# Patient Record
Sex: FEMALE | Race: BLACK OR AFRICAN AMERICAN | Employment: UNEMPLOYED | URBAN - METROPOLITAN AREA
[De-identification: names, ages, dates, MRNs, and addresses within clinical notes are randomized per-mention and may not be internally consistent; named-entity substitution may affect disease eponyms.]

---

## 2017-07-25 ENCOUNTER — TELEPHONE (OUTPATIENT)
Dept: FAMILY MEDICINE CLINIC | Age: 22
End: 2017-07-25

## 2017-07-25 DIAGNOSIS — H40.009 GLAUCOMA SUSPECT, UNSPECIFIED LATERALITY: Primary | ICD-10-CM

## 2017-07-25 NOTE — TELEPHONE ENCOUNTER
Pt is requesting a referral to an Ophthalmologist for Glaucoma. Pt was told that she has glaucoma by her ophthalomologist at her regular eye appt. Please advise. , has  [employer] and doesn't have a preference.

## 2017-10-13 ENCOUNTER — OFFICE VISIT (OUTPATIENT)
Dept: FAMILY MEDICINE CLINIC | Age: 22
End: 2017-10-13

## 2017-10-13 VITALS
TEMPERATURE: 98 F | OXYGEN SATURATION: 100 % | SYSTOLIC BLOOD PRESSURE: 98 MMHG | DIASTOLIC BLOOD PRESSURE: 70 MMHG | HEIGHT: 64 IN | BODY MASS INDEX: 26.53 KG/M2 | WEIGHT: 155.4 LBS | RESPIRATION RATE: 16 BRPM | HEART RATE: 76 BPM

## 2017-10-13 DIAGNOSIS — L85.3 DRY SKIN: Primary | ICD-10-CM

## 2017-10-13 NOTE — PATIENT INSTRUCTIONS
Dermatitis: Care Instructions  Your Care Instructions  Dermatitis is the general name used for any rash or inflammation of the skin. Different kinds of dermatitis cause different kinds of rashes. Common causes of a rash include new medicines, plants (such as poison oak or poison ivy), heat, and stress. Certain illnesses can also cause a rash. An allergic reaction to something that touches your skin, such as latex, nickel, or poison ivy, is called contact dermatitis. Contact dermatitis may also be caused by something that irritates the skin, such as bleach, a chemical, or soap. These types of rashes cannot be spread from person to person. How long your rash will last depends on what caused it. Rashes may last a few days or months. Follow-up care is a key part of your treatment and safety. Be sure to make and go to all appointments, and call your doctor if you are having problems. It's also a good idea to know your test results and keep a list of the medicines you take. How can you care for yourself at home? · Do not scratch the rash. Cut your nails short, and file them smooth. Or wear gloves if this helps keep you from scratching. · Wash the area with water only. Pat dry. · Put cold, wet cloths on the rash to reduce itching. · Keep cool, and stay out of the sun. · Leave the rash open to the air as much as possible. · If the rash itches, use hydrocortisone cream. Follow the directions on the label. Calamine lotion may help for plant rashes. · Take an over-the-counter antihistamine, such as diphenhydramine (Benadryl) or loratadine (Claritin), to help calm the itching. Read and follow all instructions on the label. · If your doctor prescribed a cream, use it as directed. If your doctor prescribed medicine, take it exactly as directed. When should you call for help?   Call your doctor now or seek immediate medical care if:  · You have symptoms of infection, such as:  ¨ Increased pain, swelling, warmth, or redness. ¨ Red streaks leading from the area. ¨ Pus draining from the area. ¨ A fever. · You have joint pain along with the rash. Watch closely for changes in your health, and be sure to contact your doctor if:  · Your rash is changing or getting worse. · You are not getting better as expected. Where can you learn more? Go to http://guido-michael.info/. Enter ([phone] [phone] in the search box to learn more about \"Dermatitis: Care Instructions. \"  Current as of: October 13, 2016  Content Version: 11.3  © 7313-6583 [employer]. Care instructions adapted under license by [employer] (which disclaims liability or warranty for this information). If you have questions about a medical condition or this instruction, always ask your healthcare professional. [address] any warranty or liability for your use of this information.

## 2017-10-13 NOTE — PROGRESS NOTES
HISTORY OF PRESENT ILLNESS  Venessa Maldonado is a 22 y.o. female. HPI: dr. [facility] [place] patient. Here with concern of dry skin mainly over chest area and around the mouth. No rash or dryness over elbow or knee. Denies any h/o asthma or eczema. Currently using dove soap and using Cetaphil lotion. Not helping much currently. Said she has been using this since long time. No recent change in soap or lotion. Denies any itching over dry skin. No h/o asthma. No h/o allergies. [place] it happens only this time of the year. No other concern. No fever. No sore throat, no itchy throat or nose. No sneezing. No abdominal pain. No urinary or bowel complains. No nausea or vomiting. Visit Vitals    BP 98/70 (BP 1 Location: Left arm, BP Patient Position: Sitting)    Pulse 76    Temp 98 °F (36.7 °C) (Oral)    Resp 16    Ht 5' 3.5\" (1.613 m)    Wt 155 lb 6.4 oz (70.5 kg)    SpO2 100%    BMI 27.1 kg/m2         ROS: see HPI     Physical Exam   Constitutional: She is oriented to person, place, and time. No distress. Neurological: She is oriented to person, place, and time. Skin:   Dry skin over chest and around mouth area. No open skin. No tenderness or itching. ASSESSMENT and PLAN    ICD-10-CM ICD-9-CM    1. Dry skin: for now advised to use Aquaphor lotion and use Aveeno soap and see if that is helping her. Over all it is dry skin. No itching will observe and f/u with PCP in couple of months. And if no improvement can consider dermatology referral.  L85.3 701.1    Pt understood and agree with the plan     Follow-up Disposition:  Return in about 2 months (around 12/13/2017).

## 2017-10-13 NOTE — PROGRESS NOTES
1. Have you been to the ER, urgent care clinic since your last visit? Hospitalized since your last visit? No    2. Have you seen or consulted any other health care providers outside of the [address] [place] [address] since your last visit? Include any pap smears or colon screening. No    Patient declined flu vaccine.

## 2017-10-13 NOTE — MR AVS SNAPSHOT
Visit Information Date & Time Provider Department Dept. Phone Encounter #  
 10/13/2017  9:45 AM Giles Membreno, [address] [id number] Follow-up Instructions Return in about 2 months (around 12/13/2017). Upcoming Health Maintenance Date Due Hepatitis A Peds Age 1-18 (2 of 2 - Standard Series) 3/26/2017 DTaP/Tdap/Td series (7 - Td) 8/26/2018 Allergies as of 10/13/2017  Review Complete On: 10/13/2017 By: Giles Membreno MD  
 No Known Allergies Current Immunizations  Reviewed on 9/26/2016 Name Date DTAP Vaccine 9/19/2001, 6/2/1998, 8/7/1997, 6/2/1997, 3/6/1997 HIB Vaccine 6/2/1998, 8/7/1997, 6/2/1997, 3/6/1997 Hep A Vaccine (Adult) 9/26/2016 Hepatitis B Vaccine 6/2/1998, 3/6/1997, 1996 Human Papillomavirus 1/4/2011, 11/19/2010, 8/26/2009 IPV 9/19/2001, 8/7/1997, 6/2/1997, 3/6/1997 Influenza Vaccine (Quad) PF 9/26/2016 Influenza Vaccine Split 11/19/2010 Influenza Vaccine Whole 6/2/1998 MMR Vaccine 9/19/2001, 6/2/1998 TDAP Vaccine 8/26/2008 Varicella Virus Vaccine Live 8/26/2008, 6/2/1998 Not reviewed this visit You Were Diagnosed With   
  
 Codes Comments Dry skin    -  Primary ICD-10-CM: L85.3 ICD-9-CM: 701.1 Vitals BP Pulse Temp Resp Height(growth percentile) Weight(growth percentile) 98/70 (BP 1 Location: Left arm, BP Patient Position: Sitting) 76 98 °F (36.7 °C) (Oral) 16 5' 3.5\" (1.613 m) 155 lb 6.4 oz (70.5 kg) LMP SpO2 BMI OB Status Smoking Status 10/08/2017 100% 27.1 kg/m2 Having regular periods Never Smoker Vitals History BMI and BSA Data Body Mass Index Body Surface Area  
 27.1 kg/m 2 1.78 m 2 Preferred Pharmacy Pharmacy Name Phone [phone] - [address], [address] [place] [address] [phone] Your Updated Medication List  
  
   
 This list is accurate as of: 10/13/17 10:43 AM.  Always use your most recent med list.  
  
  
  
  
 clobetasol 0.05 % ointment Commonly known as:  Lorraine Stuart Apply  to affected area two (2) times a day. Follow-up Instructions Return in about 2 months (around 12/13/2017). Patient Instructions Dermatitis: Care Instructions Your Care Instructions Dermatitis is the general name used for any rash or inflammation of the skin. Different kinds of dermatitis cause different kinds of rashes. Common causes of a rash include new medicines, plants (such as poison oak or poison ivy), heat, and stress. Certain illnesses can also cause a rash. An allergic reaction to something that touches your skin, such as latex, nickel, or poison ivy, is called contact dermatitis. Contact dermatitis may also be caused by something that irritates the skin, such as bleach, a chemical, or soap. These types of rashes cannot be spread from person to person. How long your rash will last depends on what caused it. Rashes may last a few days or months. Follow-up care is a key part of your treatment and safety. Be sure to make and go to all appointments, and call your doctor if you are having problems. It's also a good idea to know your test results and keep a list of the medicines you take. How can you care for yourself at home? · Do not scratch the rash. Cut your nails short, and file them smooth. Or wear gloves if this helps keep you from scratching. · Wash the area with water only. Pat dry. · Put cold, wet cloths on the rash to reduce itching. · Keep cool, and stay out of the sun. · Leave the rash open to the air as much as possible. · If the rash itches, use hydrocortisone cream. Follow the directions on the label. Calamine lotion may help for plant rashes. · Take an over-the-counter antihistamine, such as diphenhydramine (Benadryl) or loratadine (Claritin), to help calm the itching.  Read and follow all instructions on the label. · If your doctor prescribed a cream, use it as directed. If your doctor prescribed medicine, take it exactly as directed. When should you call for help? Call your doctor now or seek immediate medical care if: 
· You have symptoms of infection, such as: 
¨ Increased pain, swelling, warmth, or redness. ¨ Red streaks leading from the area. ¨ Pus draining from the area. ¨ A fever. · You have joint pain along with the rash. Watch closely for changes in your health, and be sure to contact your doctor if: 
· Your rash is changing or getting worse. · You are not getting better as expected. Where can you learn more? Go to http://guidoSNRLabsmichael.info/. Enter ([phone] 8295 3617 in the search box to learn more about \"Dermatitis: Care Instructions. \" Current as of: October 13, 2016 Content Version: 11.3 © 7274-5506 [employer] Feed. Care instructions adapted under license by "[employer] (which disclaims liability or warranty for this information). If you have questions about a medical condition or this instruction, always ask your healthcare professional. [address] any warranty or liability for your use of this information. Introducing \[facility]\"" & HEALTH SERVICES! Tatum Cotton introduces ComVibe patient portal. Now you can access parts of your medical record, email your doctor's office, and request medication refills online. 1. In your internet browser, go to [email or web address] 2. Click on the First Time User? Click Here link in the Sign In box. You will see the New Member Sign Up page. 3. Enter your ComVibe Access Code exactly as it appears below. You will not need to use this code after youve completed the sign-up process. If you do not sign up before the expiration date, you must request a new code. · ComVibe Access Code: [email or web address] Expires: 1/11/2018 10:43 AM 
 
 4. Enter the last four digits of your Social Security Number (xxxx) and Date of Birth (mm/dd/yyyy) as indicated and click Submit. You will be taken to the next sign-up page. 5. Create a Planbus ID. This will be your Planbus login ID and cannot be changed, so think of one that is secure and easy to remember. 6. Create a Planbus password. You can change your password at any time. 7. Enter your Password Reset Question and Answer. This can be used at a later time if you forget your password. 8. Enter your e-mail address. You will receive e-mail notification when new information is available in [address] 9. Click Sign Up. You can now view and download portions of your medical record. 10. Click the Download Summary menu link to download a portable copy of your medical information. If you have questions, please visit the Frequently Asked Questions section of the Planbus website. Remember, Planbus is NOT to be used for urgent needs. For medical emergencies, dial 911. Now available from your iPhone and Android! Please provide this summary of care documentation to your next provider. Your primary care clinician is listed as Anthony Streeter. If you have any questions after today's visit, please call [phone].

## 2019-08-19 ENCOUNTER — OFFICE VISIT (OUTPATIENT)
Dept: FAMILY MEDICINE CLINIC | Age: 24
End: 2019-08-19

## 2019-08-19 VITALS
OXYGEN SATURATION: 98 % | DIASTOLIC BLOOD PRESSURE: 74 MMHG | WEIGHT: 150 LBS | HEART RATE: 79 BPM | TEMPERATURE: 98.2 F | HEIGHT: 64 IN | RESPIRATION RATE: 16 BRPM | BODY MASS INDEX: 25.61 KG/M2 | SYSTOLIC BLOOD PRESSURE: 108 MMHG

## 2019-08-19 DIAGNOSIS — G44.219 EPISODIC TENSION-TYPE HEADACHE, NOT INTRACTABLE: Primary | ICD-10-CM

## 2019-08-19 DIAGNOSIS — L81.0 POST-INFLAMMATORY HYPERPIGMENTATION: ICD-10-CM

## 2019-08-19 DIAGNOSIS — F43.9 STRESS: ICD-10-CM

## 2019-08-19 RX ORDER — AMITRIPTYLINE HYDROCHLORIDE 10 MG/1
10 TABLET, FILM COATED ORAL
Qty: 30 TAB | Refills: 0 | Status: SHIPPED | OUTPATIENT
Start: 2019-08-19 | End: 2019-10-08 | Stop reason: SDUPTHER

## 2019-08-19 RX ORDER — TRIAMCINOLONE ACETONIDE 0.25 MG/G
OINTMENT TOPICAL 2 TIMES DAILY
Qty: 30 G | Refills: 0 | Status: SHIPPED | OUTPATIENT
Start: 2019-08-19 | End: 2022-06-07

## 2019-08-19 NOTE — PROGRESS NOTES
Clifford Adan, 26 y.o.,  female    SUBJECTIVE  Headache x 3-4 months    Pt reports bitemporal throbbing headache occurring about 2-3 x a week, for the past 3-4 months now. Says she does not know of particular trigger aside from school related stress. She denies nausea, aura, light or noise sensitivity, family history of migraines. Says no pattern with sleep, type of foods, weather. She takes otc midol with relief    Also with concern of chest area scaling. She says ongoing of years. Not related to soap, detergents or any topical agents. Says moisturizing frequently. No known sick contacts    ROS:  See HPI, all others negative        Patient Active Problem List   Diagnosis Code    ADD (attention deficit disorder) F98.8    Keloid L91.0       Current Outpatient Medications   Medication Sig Dispense Refill    amitriptyline (ELAVIL) 10 mg tablet Take 1 Tab by mouth nightly. 30 Tab 0    triamcinolone acetonide (KENALOG) 0.025 % ointment Apply  to affected area two (2) times a day. use thin layer 30 g 0       No Known Allergies    History reviewed. No pertinent past medical history.     Social History     Socioeconomic History    Marital status: SINGLE     Spouse name: Not on file    Number of children: Not on file    Years of education: Not on file    Highest education level: Not on file   Occupational History    Not on file   Social Needs    Financial resource strain: Not on file    Food insecurity:     Worry: Not on file     Inability: Not on file    Transportation needs:     Medical: Not on file     Non-medical: Not on file   Tobacco Use    Smoking status: Never Smoker    Smokeless tobacco: Never Used   Substance and Sexual Activity    Alcohol use: No    Drug use: No    Sexual activity: Never   Lifestyle    Physical activity:     Days per week: Not on file     Minutes per session: Not on file    Stress: Not on file   Relationships    Social connections:     Talks on phone: Not on file     Gets together: Not on file     Attends [employer] service: Not on file     Active member of club or organization: Not on file     Attends meetings of clubs or organizations: Not on file     Relationship status: Not on file    Intimate partner violence:     Fear of current or ex partner: Not on file     Emotionally abused: Not on file     Physically abused: Not on file     Forced sexual activity: Not on file   Other Topics Concern    Not on file   Social History Narrative    Not on file       Family History   Problem Relation Age of Onset    Diabetes Maternal Grandmother          OBJECTIVE    Physical Exam:     Visit Vitals  /74 (BP 1 Location: Left arm, BP Patient Position: Sitting)   Pulse 79   Temp 98.2 °F (36.8 °C) (Oral)   Resp 16   Ht 5' 3.5\" (1.613 m)   Wt 150 lb (68 kg)   SpO2 98%   BMI 26.15 kg/m²       General: alert, well-appearing,  in no apparent distress or pain  Head: atraumatic. Non-tender maxillary and frontal sinuses  Eyes: Lids with no discharge, no matting, conjunctivae clear and non injected, full EOMs, PERLLA  Ears: pinna non-tender, external auditory canal patent, TM intact  Mouth/throat:tonsils non enlarged, pharynx non erythematous and no lesion, nasal mucosa normal  Neck: supple, no adenopathy palpated  CVS: normal rate, regular rhythm, distinct S1 and S2  Lungs:clear to ausculation bilaterally, no crackles, wheezing or rhonchi noted  Abdomen: normoactive bowel sounds, soft, non-tender  Extremities: no edema, no cyanosis, MSK grossly normal  Skin +  hyperpigmented patch of almost entire upper chest area, no scaling- pt says today is a good day  Psych:  mood and affect normal        ASSESSMENT/PLAN  Diagnoses and all orders for this visit:    1. Episodic tension-type headache, not intractable  Needs better control  Start:  -     amitriptyline (ELAVIL) 10 mg tablet; Take 1 Tab by mouth nightly. Cont prn nsaids  Stress management: exercise, journaling    2.  Post-inflammatory hyperpigmentation  Start:   -     triamcinolone acetonide (KENALOG) 0.025 % ointment; Apply  to affected area two (2) times a day. use thin layer    3. Stress      Follow-up and Dispositions    · Return in about 1 month (around 9/16/2019), or if symptoms worsen or fail to improve, for plan for Complete physical on next visit. Patient understands plan of care. Patient has provided input and agrees with goals.

## 2019-08-19 NOTE — PROGRESS NOTES
1. Have you been to the ER, urgent care clinic since your last visit? Hospitalized since your last visit? No    2. Have you seen or consulted any other health care providers outside of the [address] [place] [address] since your last visit? Include any pap smears or colon screening.  No

## 2019-09-16 ENCOUNTER — TELEPHONE (OUTPATIENT)
Dept: FAMILY MEDICINE CLINIC | Age: 24
End: 2019-09-16

## 2019-09-16 DIAGNOSIS — L81.0 POST-INFLAMMATORY HYPERPIGMENTATION: Primary | ICD-10-CM

## 2019-09-16 NOTE — TELEPHONE ENCOUNTER
Pt is requesting a referral to a Dermatologist. She states that the cream that she is on for her chest is not working and its getting worse. She states that she doesn't have a preference of a doctor and has [facility] [place].  Thank you

## 2019-10-08 ENCOUNTER — OFFICE VISIT (OUTPATIENT)
Dept: FAMILY MEDICINE CLINIC | Age: 24
End: 2019-10-08

## 2019-10-08 ENCOUNTER — HOSPITAL ENCOUNTER (OUTPATIENT)
Dept: LAB | Age: 24
Discharge: HOME | End: 2019-10-08
Payer: OTHER GOVERNMENT

## 2019-10-08 VITALS
WEIGHT: 149.2 LBS | DIASTOLIC BLOOD PRESSURE: 64 MMHG | SYSTOLIC BLOOD PRESSURE: 104 MMHG | BODY MASS INDEX: 26.44 KG/M2 | OXYGEN SATURATION: 99 % | HEART RATE: 81 BPM | RESPIRATION RATE: 16 BRPM | TEMPERATURE: 98.4 F | HEIGHT: 63 IN

## 2019-10-08 DIAGNOSIS — Z12.4 SCREENING FOR MALIGNANT NEOPLASM OF THE CERVIX: ICD-10-CM

## 2019-10-08 DIAGNOSIS — G44.219 EPISODIC TENSION-TYPE HEADACHE, NOT INTRACTABLE: ICD-10-CM

## 2019-10-08 DIAGNOSIS — Z01.419 WELL WOMAN EXAM WITH ROUTINE GYNECOLOGICAL EXAM: Primary | ICD-10-CM

## 2019-10-08 DIAGNOSIS — Z23 ENCOUNTER FOR IMMUNIZATION: ICD-10-CM

## 2019-10-08 PROCEDURE — 88142 CYTOPATH C/V THIN LAYER: CPT

## 2019-10-08 RX ORDER — AMITRIPTYLINE HYDROCHLORIDE 10 MG/1
10 TABLET, FILM COATED ORAL
Qty: 90 TAB | Refills: 0 | Status: SHIPPED | OUTPATIENT
Start: 2019-10-08 | End: 2022-06-07

## 2019-10-08 NOTE — PROGRESS NOTES
Subjective:   26 y.o. female for Well Woman Check. No LMP recorded. Social History: single partner, contraception - condoms. Tension headache- started elavil last month, and reports improved symptoms, says rodrigues free since last visit. No past medical history on file. No past surgical history on file. Family History   Problem Relation Age of Onset    Diabetes Maternal Grandmother      Social History     Tobacco Use    Smoking status: Never Smoker    Smokeless tobacco: Never Used   Substance Use Topics    Alcohol use: Yes     Frequency: 2-4 times a month     Drinks per session: 3 or 4     Binge frequency: Never        ROS:  Feeling well. No dyspnea or chest pain on exertion. No abdominal pain, change in bowel habits, black or bloody stools. No urinary tract symptoms. GYN ROS: normal menses, no abnormal bleeding, pelvic pain or discharge, no breast pain or new or enlarging lumps on self exam. No neurological complaints. Objective:     Visit Vitals  /64 (BP 1 Location: Left arm, BP Patient Position: Sitting)   Pulse 81   Temp 98.4 °F (36.9 °C) (Oral)   Resp 16   Ht 5' 3\" (1.6 m)   Wt 149 lb 3.2 oz (67.7 kg)   SpO2 99%   BMI 26.43 kg/m²     The patient appears well, alert, oriented x 3, in no distress. ENT normal.  Neck supple. No adenopathy or thyromegaly. TAI. Lungs are clear, good air entry, no wheezes, rhonchi or rales. S1 and S2 normal, no murmurs, regular rate and rhythm. Abdomen soft without tenderness, guarding, mass or organomegaly. Extremities show no edema, normal peripheral pulses. Neurological is normal, no focal findings. BREAST EXAM: breasts appear normal, no suspicious masses, no skin or nipple changes or axillary nodes    PELVIC EXAM: normal external genitalia, vulva, vagina, cervix, uterus and adnexa    Assessment/Plan:   Diagnoses and all orders for this visit:    1.  Well woman exam with routine gynecological exam  well woman  pap smear- done today  return annually or prn  reviewed diet, exercise and weight control. Offered other forms of contraception, wants to continue condoms for now    2. Encounter for immunization  -     INFLUENZA VIRUS VAC QUAD,SPLIT,PRESV FREE SYRINGE IM  -     TETANUS, DIPHTHERIA TOXOIDS AND ACELLULAR PERTUSSIS VACCINE (TDAP), IN INDIVIDS. >=7, IM  -     HEPATITIS A VACCINE, ADULT DOSAGE, IM    3. Episodic tension-type headache, not intractable  Improving  Cont:  -     amitriptyline (ELAVIL) 10 mg tablet; Take 1 Tab by mouth nightly. Ff-up in 3 months or sooner prn    Patient/guardian understands plan of care. Patient has provided input and agrees with goals. Future labs to be discussed on next visit.

## 2019-10-08 NOTE — PROGRESS NOTES
Patient presents for the following vaccines: Influenza  (right deltoid), HEP A . (left deltoid) and TDAP (left deltoid) vaccines. Patient consent obtained by patient. Patient tolerated procedure well at  Influenza  (right deltoid), HEP A , (left deltoid) and TDAP (left deltoid) . Patient remained in exam room, during vaccine documentation,  for period of 10 minutes post injection to ensure no reaction. VIS was given to the patient.     Influenza Vaccine  Lot # [id number]  Exp: 2020  [address] [place] [address]: [id number]    TDAP  Lot # [id number]  Exp: 10/31/2021  [address] [place] [address]: [id number]    HEP A  Lot # [id number]  Exp: 2021  [facility] [address]: [id number]

## 2019-10-08 NOTE — PATIENT INSTRUCTIONS
Vaccine Information Statement    Influenza (Flu) Vaccine (Inactivated or Recombinant): What You Need to Know    Many Vaccine Information Statements are available in Hungarian and other languages. See www.immunize.org/vis  Hojas de información sobre vacunas están disponibles en español y en muchos otros idiomas. Visite www.immunize.org/vis    1. Why get vaccinated? Influenza vaccine can prevent influenza (flu). Flu is a contagious disease that spreads around the [facility] every year, usually between October and May. Anyone can get the flu, but it is more dangerous for some people. Infants and young children, people 72years of age and older, pregnant women, and people with certain health conditions or a weakened immune system are at greatest risk of flu complications. Pneumonia, bronchitis, sinus infections and ear infections are examples of flu-related complications. If you have a medical condition, such as heart disease, cancer or diabetes, flu can make it worse. Flu can cause fever and chills, sore throat, muscle aches, fatigue, cough, headache, and runny or stuffy nose. Some people may have vomiting and diarrhea, though this is more common in children than adults. Each year thousands of people in the [facility] die from flu, and many more are hospitalized. Flu vaccine prevents millions of illnesses and flu-related visits to the doctor each year. 2. Influenza vaccines     CDC recommends everyone 10months of age and older get vaccinated every flu season. Children 6 months through 6years of age may need 2 doses during a single flu season. Everyone else needs only 1 dose each flu season. It takes about 2 weeks for protection to develop after vaccination. There are many flu viruses, and they are always changing. Each year a new flu vaccine is made to protect against three or four viruses that are likely to cause disease in the upcoming flu season.  Even when the vaccine doesnt exactly match these viruses, it may still provide some protection. Influenza vaccine does not cause flu. Influenza vaccine may be given at the same time as other vaccines. 3. Talk with your health care provider    Tell your vaccine provider if the person getting the vaccine:   Has had an allergic reaction after a previous dose of influenza vaccine, or has any severe, life-threatening allergies.  Has ever had Guillain-Barré Syndrome (also called GBS). In some cases, your health care provider may decide to postpone influenza vaccination to a future visit. People with minor illnesses, such as a cold, may be vaccinated. People who are moderately or severely ill should usually wait until they recover before getting influenza vaccine. Your health care provider can give you more information. 4. Risks of a reaction     Soreness, redness, and swelling where shot is given, fever, muscle aches, and headache can happen after influenza vaccine.  There may be a very small increased risk of Guillain-Barré Syndrome (GBS) after inactivated influenza vaccine (the flu shot). Carlos Ready children who get the flu shot along with pneumococcal vaccine (PCV13), and/or DTaP vaccine at the same time might be slightly more likely to have a seizure caused by fever. Tell your health care provider if a child who is getting flu vaccine has ever had a seizure. People sometimes faint after medical procedures, including vaccination. Tell your provider if you feel dizzy or have vision changes or ringing in the ears. As with any medicine, there is a very remote chance of a vaccine causing a severe allergic reaction, other serious injury, or death. 5. What if there is a serious problem? An allergic reaction could occur after the vaccinated person leaves the clinic.  If you see signs of a severe allergic reaction (hives, swelling of the face and throat, difficulty breathing, a fast heartbeat, dizziness, or weakness), call 9-1-1 and get the person to the nearest hospital.    For other signs that concern you, call your health care provider. Adverse reactions should be reported to the Vaccine Adverse Event Reporting System (VAERS). Your health care provider will usually file this report, or you can do it yourself. Visit the VAERS website at www.vaers. [facility].gov or call 1-426.228.5459. VAERS is only for reporting reactions, and VAERS staff do not give medical advice. 6. The National Vaccine Injury Compensation Program    The [facility] Vaccine Injury Compensation Program (VICP) is a federal program that was created to compensate people who may have been injured by certain vaccines. Visit the VICP website at www.[facility].gov/vaccinecompensation or call 2-136.673.6860 to learn about the program and about filing a claim. There is a time limit to file a claim for compensation. 7. How can I learn more?  Ask your health care provider.  Call your local or state health department.  Contact the Centers for Disease Control and Prevention (CDC):  - Call 2-867.856.1723 (0-442-YHT-INFO) or  - Visit CDCs influenza website at www.cdc.gov/flu    Vaccine Information Statement (Interim)  Inactivated Influenza Vaccine   8/15/2019  42 U. Velia Nations 347YI-22   Department of Health and Human Services  Centers for Disease Control and Prevention    Office Use Only      Vaccine Information Statement    Hepatitis A Vaccine: What You Need to Know    Many Vaccine Information Statements are available in [place] Islander and other languages. See www.immunize.org/vis. Hojas de información Sobre Vacunas están disponibles en español y en muchos otros idiomas. Visite www.immunize.org/vis    1. Why get vaccinated? Hepatitis A is a serious liver disease. It is caused by the hepatitis A virus (HAV).  HAV is spread from person to person through contact with the feces (stool) of people who are infected, which can easily happen if someone does not wash his or her hands properly. You can also get hepatitis A from food, water, or objects contaminated with HAV. Symptoms of hepatitis A can include:   fever, fatigue, loss of appetite, nausea, vomiting, and/or joint pain   severe stomach pains and diarrhea (mainly in children), or   jaundice (yellow skin or eyes, dark urine, patrizia-colored bowel movements). These symptoms usually appear 2 to 6 weeks after exposure and usually last less than 2 months, although some people can be ill for as long as 6 months. If you have hepatitis A you may be too ill to work. Children often do not have symptoms, but most adults do. You can spread HAV without having symptoms. Hepatitis A can cause liver failure and death, although this is rare and occurs more commonly in persons 48years of age or older and persons with other liver diseases, such as hepatitis B or C. Hepatitis A vaccine can prevent hepatitis A. Hepatitis A vaccines were recommended in the [facility] beginning in 1996. Since then, the number of cases reported each year in the [place] has dropped from around 31,000 cases to fewer than 1,500 cases. 2. Hepatitis A vaccine    Hepatitis A vaccine is an inactivated (killed) vaccine. You will need 2 doses for long-lasting protection. These doses should be given at least 6 months apart. Children are routinely vaccinated between their first and second birthdays (15 through 22 months of age). Older children and adolescents can get the vaccine after 23 months. Adults who have not been vaccinated previously and want to be protected against hepatitis A can also get the vaccine.     You should get hepatitis A vaccine if you:   are traveling to countries where hepatitis A is common,   are a man who has sex with other men,   use illegal drugs,   have a chronic liver disease such as hepatitis B or hepatitis C,   are being treated with clotting-factor concentrates,    work with hepatitis A-infected animals or in a hepatitis A research laboratory, or   expect to have close personal contact with an international adoptee from a country where hepatitis A is common    Ask your healthcare provider if you want more information about any of these groups. There are no known risks to getting hepatitis A vaccine at the same time as other vaccines. 3. Some people should not get this vaccine     Tell the person who is giving you the vaccine:     If you have any severe, life-threatening allergies. If you ever had a life-threatening allergic reaction after a dose of hepatitis A vaccine, or have a severe allergy to any part of this vaccine, you may be advised not to get vaccinated. Ask your health care provider if you want information about vaccine components.  If you are not feeling well. If you have a mild illness, such as a cold, you can probably get the vaccine today. If you are moderately or severely ill, you should probably wait until you recover. Your doctor can advise you. 4. Risks of a vaccine reaction    With any medicine, including vaccines, there is a chance of side effects. These are usually mild and go away on their own, but serious reactions are also possible. Most people who get hepatitis A vaccine do not have any problems with it. Minor problems following hepatitis A vaccine include:   soreness or redness where the shot was given   low-grade fever   headache    tiredness   If these problems occur, they usually begin soon after the shot and last 1 or 2 days. Your doctor can tell you more about these reactions. Other problems that could happen after this vaccine:     People sometimes faint after a medical procedure, including vaccination. Sitting or lying down for about 15 minutes can help prevent fainting, and injuries caused by a fall. Tell your provider if you feel dizzy, or have vision changes or ringing in the ears.      Some people get shoulder pain that can be more severe and longer lasting than the more routine soreness that can follow injections. This happens very rarely.  Any medication can cause a severe allergic reaction. Such reactions from a vaccine are very rare, estimated at about 1 in a million doses, and would happen within a few minutes to a few hours after the vaccination. As with any medicine, there is a very remote chance of a vaccine causing a serious injury or death. The safety of vaccines is always being monitored. For more information, visit: www.cdc.gov/vaccinesafety/    5. What if there is a serious problem? What should I look for?  Look for anything that concerns you, such as signs of a severe allergic reaction, very high fever, or unusual behavior. Signs of a severe allergic reaction can include hives, swelling of the face and throat, difficulty breathing, a fast heartbeat, dizziness, and weakness. These would usually start a few minutes to a few hours after the vaccination. What should I do?  If you think it is a severe allergic reaction or other emergency that cant wait, call 9-1-1 and get to the nearest hospital. Otherwise, call your clinic. Afterward, the reaction should be reported to the Vaccine Adverse Event Reporting System (VAERS). Your doctor should file this report, or you can do it yourself through the VAERS web site at www.vaers. hhs.gov, or by calling 8-518.282.4365. VAERS does not give medical advice. 6. The National Vaccine Injury Compensation Program    The [facility] Vaccine Injury Compensation Program (VICP) is a federal program that was created to compensate people who may have been injured by certain vaccines. Persons who believe they may have been injured by a vaccine can learn about the program and about filing a claim by calling 4-747.377.2652 or visiting the [employer] website at www.[facility].gov/vaccinecompensation. There is a time limit to file a claim for compensation. 7. How can I learn more?      Ask your healthcare provider. He or she can give you the vaccine package insert or suggest other sources of information.  Call your local or state health department.  Contact the Centers for Disease Control and Prevention (CDC):  - Call 9-683.982.9814 (1-800-CDC-INFO) or  - Visit CDCs website at www.cdc.gov/vaccines    Vaccine Information Statement  Hepatitis A Vaccine  2016  42 [place] § 300aa-26    [place] Department of Health and Human Services  Centers for Disease Control and Prevention    Office Use Only  Vaccine Information Statement     Tdap (Tetanus, Diphtheria, Pertussis) Vaccine: What You Need to Know    Many Vaccine Information Statements are available in Persian and other languages. See www.immunize.org/vis. Hojas de Información Sobre Vacunas están disponibles en español y en muchos otros idiomas. Visite [place].si    1. Why get vaccinated? Tetanus, diphtheria, and pertussis are very serious diseases. Tdap vaccine can protect us from these diseases. And, Tdap vaccine given to pregnant women can protect  babies against pertussis. TETANUS (Lockjaw) is rare in the [facility] today. It causes painful muscle tightening and stiffness, usually all over the body.  It can lead to tightening of muscles in the head and neck so you cant open your mouth, swallow, or sometimes even breathe. Tetanus kills about 1 out of 10 people who are infected even after receiving the best medical care. DIPHTHERIA is also rare in the [facility] today. It can cause a thick coating to form in the back of the throat.  It can lead to breathing problems, heart failure, paralysis, and death. PERTUSSIS (Whooping Cough) causes severe coughing spells, which can cause difficulty breathing, vomiting, and disturbed sleep.  It can also lead to weight loss, incontinence, and rib fractures.  Up to 2 in 100 adolescents and 5 in 100 adults with pertussis are hospitalized or have complications, which could include pneumonia or death. These diseases are caused by bacteria. Diphtheria and pertussis are spread from person to person through secretions from coughing or sneezing. Tetanus enters the body through cuts, scratches, or wounds. Before vaccines, as many as 200,000 cases of diphtheria, 200,000 cases of pertussis, and hundreds of cases of tetanus, were reported in the [place] each year. Since vaccination began, reports of cases for tetanus and diphtheria have dropped by about 99% and for pertussis by about 80%. 2. Tdap vaccine    Tdap vaccine can protect adolescents and adults from tetanus, diphtheria, and pertussis. One dose of Tdap is routinely given at age 6 or 15. People who did not get Tdap at that age should get it as soon as possible. Tdap is especially important for health care professionals and anyone having close contact with a baby younger than 12 months. Pregnant women should get a dose of Tdap during every pregnancy, to protect the  from pertussis. Infants are most at risk for severe, life-threatening complications from pertussis. Another vaccine, called Td, protects against tetanus and diphtheria, but not pertussis. A Td booster should be given every 10 years. Tdap may be given as one of these boosters if you have never gotten Tdap before. Tdap may also be given after a severe cut or burn to prevent tetanus infection. Your doctor or the person giving you the vaccine can give you more information. Tdap may safely be given at the same time as other vaccines. 3. Some people should not get this vaccine     A person who has ever had a life-threatening allergic reaction after a previous dose of any diphtheria, tetanus or pertussis containing vaccine, OR has a severe allergy to any part of this vaccine, should not get Tdap vaccine. Tell the person giving the vaccine about any severe allergies.      Anyone who had coma or long repeated seizures within 7 days after a childhood dose of DTP or DTaP, or a previous dose of Tdap, should not get Tdap, unless a cause other than the vaccine was found. They can still get Td.  Talk to your doctor if you:  - have seizures or another nervous system problem,  - had severe pain or swelling after any vaccine containing diphtheria, tetanus or pertussis,   - ever had a condition called Guillain Barré Syndrome (GBS),  - arent feeling well on the day the shot is scheduled. 4. Risks    With any medicine, including vaccines, there is a chance of side effects. These are usually mild and go away on their own. Serious reactions are also possible but are rare. Most people who get Tdap vaccine do not have any problems with it. Mild Problems following Tdap  (Did not interfere with activities)   Pain where the shot was given (about 3 in 4 adolescents or 2 in 3 adults)   Redness or swelling where the shot was given (about 1 person in 5)   Mild fever of at least 100.4°F (up to about 1 in 25 adolescents or 1 in 100 adults)   Headache (about 3 or 4 people in 10)   Tiredness (about 1 person in 3 or 4)   Nausea, vomiting, diarrhea, stomach ache (up to 1 in 4 adolescents or 1 in 10 adults)   Chills,  sore joints (about 1 person in 10)   Body aches (about 1 person in 3 or 4)    Rash, swollen glands (uncommon)    Moderate Problems following Tdap  (Interfered with activities, but did not require medical attention)   Pain where the shot was given (up to 1 in 5 or 6)    Redness or swelling where the shot was given (up to about 1 in 16 adolescents or 1 in 12 adults)   Fever over 102°F (about 1 in 100 adolescents or 1 in 250 adults)   Headache (about 1 in 7 adolescents or 1 in 10 adults)   Nausea, vomiting, diarrhea, stomach ache (up to 1 or 3 people in 100)   Swelling of the entire arm where the shot was given (up to about 1 in 500).      Severe Problems following Tdap  (Unable to perform usual activities; required medical attention)   Swelling, severe pain, bleeding, and redness in the arm where the shot was given (rare). Problems that could happen after any vaccine:     People sometimes faint after a medical procedure, including vaccination. Sitting or lying down for about 15 minutes can help prevent fainting, and injuries caused by a fall. Tell your doctor if you feel dizzy, or have vision changes or ringing in the ears.  Some people get severe pain in the shoulder and have difficulty moving the arm where a shot was given. This happens very rarely.  Any medication can cause a severe allergic reaction. Such reactions from a vaccine are very rare, estimated at fewer than 1 in a million doses, and would happen within a few minutes to a few hours after the vaccination. As with any medicine, there is a very remote chance of a vaccine causing a serious injury or death. The safety of vaccines is always being monitored. For more information, visit: www.cdc.gov/vaccinesafety/    5. What if there is a serious problem? What should I look for?  Look for anything that concerns you, such as signs of a severe allergic reaction, very high fever, or unusual behavior.  Signs of a severe allergic reaction can include hives, swelling of the face and throat, difficulty breathing, a fast heartbeat, dizziness, and weakness. These would usually start a few minutes to a few hours after the vaccination. What should I do?  If you think it is a severe allergic reaction or other emergency that cant wait, call 9-1-1 or get the person to the nearest hospital. Otherwise, call your doctor.  Afterward, the reaction should be reported to the Vaccine Adverse Event Reporting System (VAERS). Your doctor might file this report, or you can do it yourself through the VAERS web site at www.vaers. hhs.gov, or by calling 4-733.831.2470. VAERS does not give medical advice.     6. The National Vaccine Injury [employer]    The National Vaccine Injury Compensation Program (VICP) is a federal program that was created to compensate people who may have been injured by certain vaccines. Persons who believe they may have been injured by a vaccine can learn about the program and about filing a claim by calling 2-662.610.9479 or visiting the [employer] website at www.[facility].gov/vaccinecompensation. There is a time limit to file a claim for compensation. 7. How can I learn more?  Ask your doctor. He or she can give you the vaccine package insert or suggest other sources of information.  Call your local or state health department.  Contact the Centers for Disease Control and Prevention (CDC):  - Call 0-888.388.9709 (1-800-CDC-INFO) or  - Visit CDCs website at www.cdc.gov/vaccines      Vaccine Information Statement   Tdap Vaccine  (2/24/2015)  42 BEENA Tai 855AY-71    Department of Health and Human Services  Centers for Disease Control and Prevention    Office Use Only

## 2019-10-08 NOTE — PROGRESS NOTES
1. Have you been to the ER, urgent care clinic since your last visit? Hospitalized since your last visit? No    2. Have you seen or consulted any other health care providers outside of the [address] [place] [address] since your last visit? Include any pap smears or colon screening. [facility] LOV: 10/04/19     Patient presents for annual pap smear. Last pap None. Abnormal Pap smears No.  Procedures if indicated None. Form of contraception Condoms. Mammogram (if indicated) No. Family history of breast CA No, colon CA No, cervical CA No.Tetanus 8/26/2008.

## 2022-06-07 ENCOUNTER — HOSPITAL ENCOUNTER (OUTPATIENT)
Dept: LAB | Age: 27
Discharge: HOME | End: 2022-06-07
Payer: COMMERCIAL

## 2022-06-07 ENCOUNTER — OFFICE VISIT (OUTPATIENT)
Dept: FAMILY MEDICINE CLINIC | Age: 27
End: 2022-06-07
Payer: MEDICARE

## 2022-06-07 VITALS
HEART RATE: 89 BPM | HEIGHT: 63 IN | WEIGHT: 147.6 LBS | OXYGEN SATURATION: 100 % | DIASTOLIC BLOOD PRESSURE: 74 MMHG | SYSTOLIC BLOOD PRESSURE: 100 MMHG | RESPIRATION RATE: 16 BRPM | BODY MASS INDEX: 26.15 KG/M2 | TEMPERATURE: 96.9 F

## 2022-06-07 DIAGNOSIS — Z01.419 WELL WOMAN EXAM WITH ROUTINE GYNECOLOGICAL EXAM: Primary | ICD-10-CM

## 2022-06-07 DIAGNOSIS — Z12.4 CERVICAL CANCER SCREENING: ICD-10-CM

## 2022-06-07 PROCEDURE — 99395 PREV VISIT EST AGE 18-39: CPT | Performed by: FAMILY MEDICINE

## 2022-06-07 PROCEDURE — 87661 TRICHOMONAS VAGINALIS AMPLIF: CPT

## 2022-06-07 PROCEDURE — 88175 CYTOPATH C/V AUTO FLUID REDO: CPT

## 2022-06-07 NOTE — PROGRESS NOTES
Subjective:   23 y.o. female for Well Woman Check. Patient's last menstrual period was 05/22/2022. Social History: single partner, contraception - condoms. Pertinent past medical hstory: no history of HTN, DVT, CAD, DM, liver disease, migraines or smoking. Patient Active Problem List    Diagnosis Date Noted    Keloid 07/25/2016    ADD (attention deficit disorder) 02/09/2011       No Known Allergies  No past medical history on file. No past surgical history on file. Family History   Problem Relation Age of Onset    Diabetes Maternal Grandmother      Social History     Tobacco Use    Smoking status: Never Smoker    Smokeless tobacco: Never Used   Substance Use Topics    Alcohol use: Not Currently        ROS:  Feeling well. No dyspnea or chest pain on exertion. No abdominal pain, change in bowel habits, black or bloody stools. No urinary tract symptoms. GYN ROS: normal menses, no abnormal bleeding, pelvic pain or discharge, no breast pain or new or enlarging lumps on self exam. No neurological complaints. Objective:     Visit Vitals  /74 (BP 1 Location: Left upper arm, BP Patient Position: Sitting, BP Cuff Size: Adult)   Pulse 89   Temp 96.9 °F (36.1 °C) (Temporal)   Resp 16   Ht 5' 3.25\" (1.607 m)   Wt 147 lb 9.6 oz (67 kg)   LMP 05/22/2022   SpO2 100%   BMI 25.94 kg/m²     The patient appears well, alert, oriented x 3, in no distress. ENT normal.  Neck supple. No adenopathy or thyromegaly. TAI. Lungs are clear, good air entry, no wheezes, rhonchi or rales. S1 and S2 normal, no murmurs, regular rate and rhythm. Abdomen soft without tenderness, guarding, mass or organomegaly. Extremities show no edema, normal peripheral pulses. Neurological is normal, no focal findings.     BREAST EXAM: breasts appear normal, no suspicious masses, no skin or nipple changes or axillary nodes    PELVIC EXAM: normal external genitalia, vulva, vagina, cervix, uterus and adnexa    Assessment/Plan:   Diagnoses and all orders for this visit:    1. Well woman exam with routine gynecological exam  well woman  pap smear  return annually or prn  reviewed diet, exercise and weight control. Discussed other forms of contraception, she will consider, for now cont consistent condom use  Completed HPV vaccine    2. Cervical cancer screening  -     PAP IG, CT-NG-TV, RFX APTIMA HPV ASCUS ([id number]); Future    Ff-up in 1 year or sooner prn    Patient/guardian understands plan of care. Patient has provided input and agrees with goals. Future labs to be discussed on next visit.

## 2022-06-07 NOTE — PATIENT INSTRUCTIONS
A Healthy Heart: Care Instructions  Your Care Instructions     Coronary artery disease, also called heart disease, occurs when a substance called plaque builds up in the vessels that supply oxygen-rich blood to your heart muscle. This can narrow the blood vessels and reduce blood flow. A heart attack happens when blood flow is completely blocked. A high-fat diet, smoking, and other factors increase the risk of heart disease. Your doctor has found that you have a chance of having heart disease. You can do lots of things to keep your heart healthy. It may not be easy, but you can change your diet, exercise more, and quit smoking. These steps really work to lower your chance of heart disease. Follow-up care is a key part of your treatment and safety. Be sure to make and go to all appointments, and call your doctor if you are having problems. It's also a good idea to know your test results and keep a list of the medicines you take. How can you care for yourself at home? Diet    · Use less salt when you cook and eat. This helps lower your blood pressure. Taste food before salting. Add only a little salt when you think you need it. With time, your taste buds will adjust to less salt.     · Eat fewer snack items, fast foods, canned soups, and other high-salt, high-fat, processed foods.     · Read food labels and try to avoid saturated and trans fats. They increase your risk of heart disease by raising cholesterol levels.     · Limit the amount of solid fat-butter, margarine, and shortening-you eat. Use olive, peanut, or canola oil when you cook. Bake, broil, and steam foods instead of frying them.     · Eat a variety of fruit and vegetables every day. Dark green, deep orange, red, or yellow fruits and vegetables are especially good for you. Examples include spinach, carrots, peaches, and berries.     · Foods high in fiber can reduce your cholesterol and provide important vitamins and minerals.  High-fiber foods include whole-grain cereals and breads, oatmeal, beans, brown rice, citrus fruits, and apples.     · Eat lean proteins. Heart-healthy proteins include seafood, lean meats and poultry, eggs, beans, peas, nuts, seeds, and soy products.     · Limit drinks and foods with added sugar. These include candy, desserts, and soda pop. Lifestyle changes    · If your doctor recommends it, get more exercise. Walking is a good choice. Bit by bit, increase the amount you walk every day. Try for at least 30 minutes on most days of the week. You also may want to swim, bike, or do other activities.     · Do not smoke. If you need help quitting, talk to your doctor about stop-smoking programs and medicines. These can increase your chances of quitting for good. Quitting smoking may be the most important step you can take to protect your heart. It is never too late to quit.     · Limit alcohol to 2 drinks a day for men and 1 drink a day for women. Too much alcohol can cause health problems.     · Manage other health problems such as diabetes, high blood pressure, and high cholesterol. If you think you may have a problem with alcohol or drug use, talk to your doctor. Medicines    · Take your medicines exactly as prescribed. Call your doctor if you think you are having a problem with your medicine.     · If your doctor recommends aspirin, take the amount directed each day. Make sure you take aspirin and not another kind of pain reliever, such as acetaminophen (Tylenol). When should you call for help? Call 911 if you have symptoms of a heart attack. These may include:    · Chest pain or pressure, or a strange feeling in the chest.     · Sweating.     · Shortness of breath.     · Pain, pressure, or a strange feeling in the back, neck, jaw, or upper belly or in one or both shoulders or arms.     · Lightheadedness or sudden weakness.     · A fast or irregular heartbeat.    After you call 911, the  may tell you to chew 1 adult-strength or 2 to 4 low-dose aspirin. Wait for an ambulance. Do not try to drive yourself. Watch closely for changes in your health, and be sure to contact your doctor if you have any problems. Where can you learn more? Go to http://www.easley.com/  Enter F075 in the search box to learn more about \"A Healthy Heart: Care Instructions. \"  Current as of: January 10, 2022               Content Version: 13.2  © 2006-2022 [employer]. Care instructions adapted under license by [employer] (which disclaims liability or warranty for this information). If you have questions about a medical condition or this instruction, always ask your healthcare professional. [address] any warranty or liability for your use of this information.

## 2022-06-07 NOTE — PROGRESS NOTES
1. \"Have you been to the ER, urgent care clinic since your last visit? Hospitalized since your last visit? \" No  2. \"Have you seen or consulted any other health care providers outside of the [address] [place] [address] since your last visit? \" No     3. For patients aged 39-70: Has the patient had a colonoscopy / FIT/ Cologuard? No      If the patient is female:    4. For patients aged 41-77: Has the patient had a mammogram within the past 2 years? No      5. For patients aged 21-65: Has the patient had a pap smear?  Yes - no Care Gap present 10/08/2019    Chief Complaint   Patient presents with    Well Woman     patient reports left wrist pain - no pain today and has not had wrist pain in one month

## 2022-06-10 LAB
C TRACH RRNA SPEC QL NAA+PROBE: NEGATIVE
N GONORRHOEA RRNA SPEC QL NAA+PROBE: NEGATIVE
SPECIMEN SOURCE: NORMAL
T VAGINALIS RRNA SPEC QL NAA+PROBE: NEGATIVE

## 2022-11-07 ENCOUNTER — TELEPHONE (OUTPATIENT)
Dept: FAMILY MEDICINE CLINIC | Age: 27
End: 2022-11-07

## 2022-11-07 NOTE — TELEPHONE ENCOUNTER
Called patient to schedule a sooner appt. Available appt for 11/08/2022 at 0945 am  Left message advising patient to return my call.

## 2022-11-07 NOTE — TELEPHONE ENCOUNTER
Patient has been having headaches for 3 weeks. IT makes her nauseous and dizzy when looking at her computer screens at work. I gave the patient the next available appointment on 11/17, but the patient requested a message be sent back to see if there is anything that can be done in the meantime or any x-ray to see if there's something wrong. Patient is very concerned and anxious.

## 2022-11-08 ENCOUNTER — OFFICE VISIT (OUTPATIENT)
Dept: FAMILY MEDICINE CLINIC | Age: 27
End: 2022-11-08
Payer: MEDICARE

## 2022-11-08 VITALS
HEIGHT: 63 IN | TEMPERATURE: 98.3 F | OXYGEN SATURATION: 100 % | RESPIRATION RATE: 18 BRPM | DIASTOLIC BLOOD PRESSURE: 76 MMHG | SYSTOLIC BLOOD PRESSURE: 112 MMHG | BODY MASS INDEX: 28.35 KG/M2 | WEIGHT: 160 LBS | HEART RATE: 78 BPM

## 2022-11-08 DIAGNOSIS — G44.52 NEW PERSISTENT DAILY HEADACHE: Primary | ICD-10-CM

## 2022-11-08 PROCEDURE — G8419 CALC BMI OUT NRM PARAM NOF/U: HCPCS | Performed by: FAMILY MEDICINE

## 2022-11-08 PROCEDURE — 99214 OFFICE O/P EST MOD 30 MIN: CPT | Performed by: FAMILY MEDICINE

## 2022-11-08 PROCEDURE — G8427 DOCREV CUR MEDS BY ELIG CLIN: HCPCS | Performed by: FAMILY MEDICINE

## 2022-11-08 PROCEDURE — G8432 DEP SCR NOT DOC, RNG: HCPCS | Performed by: FAMILY MEDICINE

## 2022-11-08 RX ORDER — PROPRANOLOL HYDROCHLORIDE 60 MG/1
60 CAPSULE, EXTENDED RELEASE ORAL DAILY
Qty: 30 CAPSULE | Refills: 1 | Status: SHIPPED | OUTPATIENT
Start: 2022-11-08

## 2022-11-08 RX ORDER — SUMATRIPTAN 25 MG/1
25 TABLET, FILM COATED ORAL
Qty: 6 TABLET | Refills: 0 | Status: SHIPPED | OUTPATIENT
Start: 2022-11-08 | End: 2022-11-08

## 2022-11-08 NOTE — PROGRESS NOTES
Chief Complaint   Patient presents with    Headache     Onset at age 13 (having headaches every other day)           1. \"Have you been to the ER, urgent care clinic since your last visit? Hospitalized since your last visit? \" No    2. \"Have you seen or consulted any other health care providers outside of the [address] [place] [address] since your last visit? \" No     3. For patients aged 39-70: Has the patient had a colonoscopy / FIT/ Cologuard? NA - based on age      If the patient is female:    4. For patients aged 41-77: Has the patient had a mammogram within the past 2 years? NA - based on age or sex      11. For patients aged 21-65: Has the patient had a pap smear?  Yes - no Care Gap present June of 2022

## 2022-11-08 NOTE — PROGRESS NOTES
Brenden Souza, 23 y.o.,  female    SUBJECTIVE  Headache x years    Pt reports about weekly headaches since she was 13, however past few months, has intensified to about every other day, with nausea. No light sensitivity, noise sensitivity. No particular pattern/trigger. She does work 12 hour shifts as  and long hours on computer screen seems to worsen, takes tylenol/ibuprofen with relief. She has had optometry vision check kia this year with only minor refraction issues. Not worse with menses, no fam hx migraine headaches, no focal neuro deficit. Requesting imaging study    ROS:  See HPI, all others negative        Patient Active Problem List   Diagnosis Code    ADD (attention deficit disorder) F98.8    Keloid L91.0       Current Outpatient Medications   Medication Sig Dispense Refill    propranolol LA (INDERAL LA) 60 mg SR capsule Take 1 Capsule by mouth daily. 30 Capsule 1    SUMAtriptan (Imitrex) 25 mg tablet Take 1 Tablet by mouth once as needed for Migraine for up to 1 dose. 6 Tablet 0       No Known Allergies    History reviewed. No pertinent past medical history.     Social History     Socioeconomic History    Marital status: SINGLE     Spouse name: Not on file    Number of children: Not on file    Years of education: Not on file    Highest education level: Not on file   Occupational History    Not on file   Tobacco Use    Smoking status: Never    Smokeless tobacco: Never   Substance and Sexual Activity    Alcohol use: Not Currently    Drug use: No    Sexual activity: Yes     Partners: Male     Birth control/protection: Condom   Other Topics Concern    Not on file   Social History Narrative    Not on file     Social Determinants of Health     Financial Resource Strain: Low Risk     Difficulty of Paying Living Expenses: Not very hard   Food Insecurity: Food Insecurity Present    Worried About Running Out of Food in the Last Year: Sometimes true    Ran Out of Food in the Last Year: Sometimes true   Transportation Needs: Not on file   Physical Activity: Not on file   Stress: Not on file   Social Connections: Not on file   Intimate Partner Violence: Not on file   Housing Stability: Not on file       Family History   Problem Relation Age of Onset    Diabetes Maternal Grandmother          OBJECTIVE    Physical Exam:     Visit Vitals  /76 (BP 1 Location: Left arm, BP Patient Position: Sitting, BP Cuff Size: Large adult)   Pulse 78   Temp 98.3 °F (36.8 °C) (Temporal)   Resp 18   Ht 5' 3.25\" (1.607 m)   Wt 160 lb (72.6 kg)   LMP 11/04/2022   SpO2 100%   BMI 28.12 kg/m²       General: alert, well-appearing,  AA, in no apparent distress or pain  Head: atraumatic. Non-tender maxillary and frontal sinuses  Eyes: Lids with no discharge, no matting, conjunctivae clear and non injected, full EOMs, PERLLA  Ears: pinna non-tender, external auditory canal patent, TM intact  Neck: supple, no adenopathy palpated  CVS: normal rate, regular rhythm, distinct S1 and S2  Lungs:clear to ausculation bilaterally, no crackles, wheezing or rhonchi noted  Abdomen: normoactive bowel sounds, soft, non-tender  Neurological: Alert, oriented to person, place and time. 3/3, Immediate recall 3/3, language, repetition, naming and comprehension intact. MMSE  CN II-XII grossly intact  Motor exam: 5/5 in bilateral upper and lower extremities   exam reveals alert, oriented, normal speech, no focal findings or movement disorder noted, neck supple without rigidity. Extremities: no edema, no cyanosis, MSK grossly normal  Skin: warm, no lesions, rashes noted  Psych:  mood and affect normal        ASSESSMENT/PLAN  Diagnoses and all orders for this visit:    1. New persistent daily headache  Working dx migraine ha  -     MRI BRAIN WO CONT; Future  Start BB for ppx, and prn imitrex  -     propranolol LA (INDERAL LA) 60 mg SR capsule; Take 1 Capsule by mouth daily.  -     SUMAtriptan (Imitrex) 25 mg tablet;  Take 1 Tablet by mouth once as needed for Migraine for up to 1 dose. Advised ha diary, avoid triggers        Follow-up and Dispositions    Return in about 4 weeks (around 12/6/2022), or if symptoms worsen or fail to improve, for routine chronic illness care. Patient understands plan of care. Patient has provided input and agrees with goals.

## 2022-11-08 NOTE — LETTER
NOTIFICATION RETURN TO WORK / SCHOOL    11/8/2022 10:28 AM    Ms. Mckenna Santiago  [address]      To Whom It May Concern:    Mckenna Santiago is currently under the care of [address] She will return to work/school on: Friday, November 11,2022    If there are questions or concerns please have the patient contact our office.         Sincerely,      Katelyn Turner MD

## 2022-12-10 ENCOUNTER — HOSPITAL ENCOUNTER (OUTPATIENT)
Age: 27
End: 2022-12-10
Attending: FAMILY MEDICINE
Payer: COMMERCIAL

## 2022-12-10 DIAGNOSIS — G44.52 NEW PERSISTENT DAILY HEADACHE: ICD-10-CM

## 2022-12-10 PROCEDURE — 70551 MRI BRAIN STEM W/O DYE: CPT

## 2022-12-15 NOTE — PROGRESS NOTES
MRI brain without abnormal findings  Recommend ff-up for reevaluation of HA, medication response, pls sched/inform pt

## 2022-12-28 NOTE — PROGRESS NOTES
Patient identified with 2 identifiers (name and ). Patient aware of normal MRI.  Patient has been scheduled for 2023 at [facility]

## 2023-01-16 RX ORDER — PROPRANOLOL HYDROCHLORIDE 60 MG/1
CAPSULE, EXTENDED RELEASE ORAL
Qty: 30 CAPSULE | Refills: 1 | Status: SHIPPED | OUTPATIENT
Start: 2023-01-16

## 2023-10-20 SDOH — ECONOMIC STABILITY: FOOD INSECURITY: WITHIN THE PAST 12 MONTHS, THE FOOD YOU BOUGHT JUST DIDN'T LAST AND YOU DIDN'T HAVE MONEY TO GET MORE.: NEVER TRUE

## 2023-10-20 SDOH — ECONOMIC STABILITY: FOOD INSECURITY: WITHIN THE PAST 12 MONTHS, YOU WORRIED THAT YOUR FOOD WOULD RUN OUT BEFORE YOU GOT MONEY TO BUY MORE.: NEVER TRUE

## 2023-10-20 SDOH — ECONOMIC STABILITY: INCOME INSECURITY: HOW HARD IS IT FOR YOU TO PAY FOR THE VERY BASICS LIKE FOOD, HOUSING, MEDICAL CARE, AND HEATING?: NOT HARD AT ALL

## 2023-10-20 SDOH — ECONOMIC STABILITY: TRANSPORTATION INSECURITY
IN THE PAST 12 MONTHS, HAS LACK OF TRANSPORTATION KEPT YOU FROM MEETINGS, WORK, OR FROM GETTING THINGS NEEDED FOR DAILY LIVING?: NO

## 2023-10-20 SDOH — ECONOMIC STABILITY: HOUSING INSECURITY
IN THE LAST 12 MONTHS, WAS THERE A TIME WHEN YOU DID NOT HAVE A STEADY PLACE TO SLEEP OR SLEPT IN A SHELTER (INCLUDING NOW)?: NO

## 2023-10-21 NOTE — PROGRESS NOTES
1. \"Have you been to the ER, urgent care clinic since your last visit? Hospitalized since your last visit? \" No    2. \"Have you seen or consulted any other health care providers outside of the [address] [place] [address] since your last visit? \" No     3. For patients aged 43-73: Has the patient had a colonoscopy / FIT/ Cologuard? NA - based on age      If the patient is female:    4. For patients aged 43-66: Has the patient had a mammogram within the past 2 years? NA - based on age or sex      11. For patients aged 21-65: Has the patient had a pap smear?  Yes - no Care Gap present

## 2023-10-23 ENCOUNTER — OFFICE VISIT (OUTPATIENT)
Age: 28
End: 2023-10-23
Payer: COMMERCIAL

## 2023-10-23 ENCOUNTER — HOSPITAL ENCOUNTER (OUTPATIENT)
Facility: HOSPITAL | Age: 28
Discharge: HOME | End: 2023-10-26

## 2023-10-23 VITALS
OXYGEN SATURATION: 100 % | DIASTOLIC BLOOD PRESSURE: 64 MMHG | RESPIRATION RATE: 16 BRPM | TEMPERATURE: 97.3 F | HEIGHT: 64 IN | HEART RATE: 82 BPM | WEIGHT: 167 LBS | SYSTOLIC BLOOD PRESSURE: 116 MMHG | BODY MASS INDEX: 28.51 KG/M2

## 2023-10-23 DIAGNOSIS — R53.82 CHRONIC FATIGUE: ICD-10-CM

## 2023-10-23 DIAGNOSIS — Z23 NEEDS FLU SHOT: ICD-10-CM

## 2023-10-23 DIAGNOSIS — G43.709 CHRONIC MIGRAINE WITHOUT AURA WITHOUT STATUS MIGRAINOSUS, NOT INTRACTABLE: Primary | ICD-10-CM

## 2023-10-23 DIAGNOSIS — F32.1 MODERATE MAJOR DEPRESSION (HCC): ICD-10-CM

## 2023-10-23 LAB — SENTARA SPECIMEN COLLECTION: NORMAL

## 2023-10-23 PROCEDURE — 90674 CCIIV4 VAC NO PRSV 0.5 ML IM: CPT | Performed by: FAMILY MEDICINE

## 2023-10-23 PROCEDURE — 99214 OFFICE O/P EST MOD 30 MIN: CPT | Performed by: FAMILY MEDICINE

## 2023-10-23 PROCEDURE — 90471 IMMUNIZATION ADMIN: CPT | Performed by: FAMILY MEDICINE

## 2023-10-23 RX ORDER — PROPRANOLOL HCL 60 MG
60 CAPSULE, EXTENDED RELEASE 24HR ORAL DAILY
Qty: 30 CAPSULE | Refills: 0 | Status: SHIPPED | OUTPATIENT
Start: 2023-10-23

## 2023-10-23 RX ORDER — VENLAFAXINE HYDROCHLORIDE 37.5 MG/1
37.5 CAPSULE, EXTENDED RELEASE ORAL DAILY
Qty: 30 CAPSULE | Refills: 0 | Status: SHIPPED | OUTPATIENT
Start: 2023-10-23

## 2023-10-23 ASSESSMENT — PATIENT HEALTH QUESTIONNAIRE - PHQ9
SUM OF ALL RESPONSES TO PHQ QUESTIONS 1-9: 17
SUM OF ALL RESPONSES TO PHQ QUESTIONS 1-9: 17
6. FEELING BAD ABOUT YOURSELF - OR THAT YOU ARE A FAILURE OR HAVE LET YOURSELF OR YOUR FAMILY DOWN: 0
10. IF YOU CHECKED OFF ANY PROBLEMS, HOW DIFFICULT HAVE THESE PROBLEMS MADE IT FOR YOU TO DO YOUR WORK, TAKE CARE OF THINGS AT HOME, OR GET ALONG WITH OTHER PEOPLE: 2
7. TROUBLE CONCENTRATING ON THINGS, SUCH AS READING THE NEWSPAPER OR WATCHING TELEVISION: 3
1. LITTLE INTEREST OR PLEASURE IN DOING THINGS: 2
5. POOR APPETITE OR OVEREATING: 3
3. TROUBLE FALLING OR STAYING ASLEEP: 3
SUM OF ALL RESPONSES TO PHQ QUESTIONS 1-9: 17
8. MOVING OR SPEAKING SO SLOWLY THAT OTHER PEOPLE COULD HAVE NOTICED. OR THE OPPOSITE, BEING SO FIGETY OR RESTLESS THAT YOU HAVE BEEN MOVING AROUND A LOT MORE THAN USUAL: 0
SUM OF ALL RESPONSES TO PHQ QUESTIONS 1-9: 17
9. THOUGHTS THAT YOU WOULD BE BETTER OFF DEAD, OR OF HURTING YOURSELF: 0
SUM OF ALL RESPONSES TO PHQ9 QUESTIONS 1 & 2: 5
2. FEELING DOWN, DEPRESSED OR HOPELESS: 3
4. FEELING TIRED OR HAVING LITTLE ENERGY: 3

## 2023-10-23 NOTE — PROGRESS NOTES
Flucelvax  0.5 ml given IM in right deltoid. Lot # [id number], exp date 06/30/2024. Patient tolerated injection well. No adverse reaction noted.

## 2023-10-23 NOTE — PROGRESS NOTES
Vianca Fuentes, 33 y.o.,  female    SUBJECTIVE  Ff-up    Migraine headaches- have not seen pt for a year, says 3-4 migraine ha's per week. Takes otc nsaids, says recalls propranolol to be effective, requesting refills  Says having difficulty performing work duties as , long hours/overtime. She also reports poor energy, feeling depressed past 2 years, mother has depression PHQ 9-17. Requesting time off for burn out, does not have forms with her. Denies harmful ideation    ROS:  See HPI, all others negative        Patient Active Problem List   Diagnosis    Keloid    ADD (attention deficit disorder)    Moderate major depression (HCC)    Chronic migraine without aura without status migrainosus, not intractable       Current Outpatient Medications   Medication Sig Dispense Refill    propranolol (INDERAL LA) 60 MG extended release capsule Take 1 capsule by mouth daily 30 capsule 0    venlafaxine (EFFEXOR XR) 37.5 MG extended release capsule Take 1 capsule by mouth daily 30 capsule 0     No current facility-administered medications for this visit. No Known Allergies    No past medical history on file.     Social History     Socioeconomic History    Marital status: Single     Spouse name: Not on file    Number of children: Not on file    Years of education: Not on file    Highest education level: Not on file   Occupational History    Not on file   Tobacco Use    Smoking status: Never    Smokeless tobacco: Never   Substance and Sexual Activity    Alcohol use: Not Currently    Drug use: No    Sexual activity: Not on file   Other Topics Concern    Not on file   Social History Narrative    Not on file     Social Determinants of Health     Financial Resource Strain: Low Risk  (11/8/2022)    Overall Financial Resource Strain (CARDIA)     Difficulty of Paying Living Expenses: Not very hard   Food Insecurity: Food Insecurity Present (11/8/2022)    Hunger Vital Sign     Worried About Running Out of Food in the

## 2023-11-08 ENCOUNTER — OFFICE VISIT (OUTPATIENT)
Age: 28
End: 2023-11-08
Payer: COMMERCIAL

## 2023-11-08 VITALS
WEIGHT: 161 LBS | SYSTOLIC BLOOD PRESSURE: 115 MMHG | DIASTOLIC BLOOD PRESSURE: 70 MMHG | HEART RATE: 98 BPM | OXYGEN SATURATION: 98 % | BODY MASS INDEX: 28.07 KG/M2 | TEMPERATURE: 97.8 F

## 2023-11-08 DIAGNOSIS — G47.9 SLEEP DIFFICULTIES: ICD-10-CM

## 2023-11-08 DIAGNOSIS — Z01.419 WELL WOMAN EXAM WITH ROUTINE GYNECOLOGICAL EXAM: Primary | ICD-10-CM

## 2023-11-08 DIAGNOSIS — F32.1 MODERATE MAJOR DEPRESSION (HCC): ICD-10-CM

## 2023-11-08 DIAGNOSIS — G43.709 CHRONIC MIGRAINE WITHOUT AURA WITHOUT STATUS MIGRAINOSUS, NOT INTRACTABLE: ICD-10-CM

## 2023-11-08 DIAGNOSIS — D70.9 NEUTROPENIA, UNSPECIFIED TYPE (HCC): ICD-10-CM

## 2023-11-08 PROCEDURE — 99395 PREV VISIT EST AGE 18-39: CPT | Performed by: FAMILY MEDICINE

## 2023-11-08 RX ORDER — VENLAFAXINE HYDROCHLORIDE 75 MG/1
150 CAPSULE, EXTENDED RELEASE ORAL DAILY
Qty: 90 CAPSULE | Refills: 0 | Status: SHIPPED | OUTPATIENT
Start: 2023-11-08

## 2023-11-08 RX ORDER — AMITRIPTYLINE HYDROCHLORIDE 25 MG/1
25 TABLET, FILM COATED ORAL NIGHTLY
Qty: 90 TABLET | Refills: 0 | Status: SHIPPED | OUTPATIENT
Start: 2023-11-08

## 2023-11-08 ASSESSMENT — COLUMBIA-SUICIDE SEVERITY RATING SCALE - C-SSRS
4. HAVE YOU HAD THESE THOUGHTS AND HAD SOME INTENTION OF ACTING ON THEM?: NO
3. HAVE YOU BEEN THINKING ABOUT HOW YOU MIGHT KILL YOURSELF?: NO
7. DID THIS OCCUR IN THE LAST THREE MONTHS: NO
5. HAVE YOU STARTED TO WORK OUT OR WORKED OUT THE DETAILS OF HOW TO KILL YOURSELF? DO YOU INTEND TO CARRY OUT THIS PLAN?: NO

## 2023-11-08 ASSESSMENT — PATIENT HEALTH QUESTIONNAIRE - PHQ9
6. FEELING BAD ABOUT YOURSELF - OR THAT YOU ARE A FAILURE OR HAVE LET YOURSELF OR YOUR FAMILY DOWN: 0
SUM OF ALL RESPONSES TO PHQ QUESTIONS 1-9: 8
3. TROUBLE FALLING OR STAYING ASLEEP: 2
8. MOVING OR SPEAKING SO SLOWLY THAT OTHER PEOPLE COULD HAVE NOTICED. OR THE OPPOSITE, BEING SO FIGETY OR RESTLESS THAT YOU HAVE BEEN MOVING AROUND A LOT MORE THAN USUAL: 0
SUM OF ALL RESPONSES TO PHQ QUESTIONS 1-9: 8
7. TROUBLE CONCENTRATING ON THINGS, SUCH AS READING THE NEWSPAPER OR WATCHING TELEVISION: 1
SUM OF ALL RESPONSES TO PHQ QUESTIONS 1-9: 8
SUM OF ALL RESPONSES TO PHQ9 QUESTIONS 1 & 2: 2
4. FEELING TIRED OR HAVING LITTLE ENERGY: 1
SUM OF ALL RESPONSES TO PHQ QUESTIONS 1-9: 8
9. THOUGHTS THAT YOU WOULD BE BETTER OFF DEAD, OR OF HURTING YOURSELF: 0
5. POOR APPETITE OR OVEREATING: 2
1. LITTLE INTEREST OR PLEASURE IN DOING THINGS: 1
2. FEELING DOWN, DEPRESSED OR HOPELESS: 1
10. IF YOU CHECKED OFF ANY PROBLEMS, HOW DIFFICULT HAVE THESE PROBLEMS MADE IT FOR YOU TO DO YOUR WORK, TAKE CARE OF THINGS AT HOME, OR GET ALONG WITH OTHER PEOPLE: 2

## 2023-11-08 NOTE — PROGRESS NOTES
RDW 12.4 10/23/2023 10:04 AM     10/23/2023 10:04 AM    MPV 11.0 10/23/2023 10:04 AM        Lipids No results found for: \"CHOL\", \"TRIG\", \"HDL\", \"LDLCALC\", \"LABVLDL\", \"CHOLHDLRATIO\"      Imaging results last 24 hrs :No results found. Imaging results impression onlyNo results found. No orders to display       A1c: No results found for: \"LABA1C\", \"HBA1C\"      PHQ-9 Total Score: 8 (11/8/2023  2:12 PM)  Thoughts that you would be better off dead, or of hurting yourself in some way: 0 (11/8/2023  2:12 PM)      ASSESSMENT/PLAN  Aida Cross was seen today for annual exam.    Diagnoses and all orders for this visit:    Well woman exam with routine gynecological exam  Discussed healthy lifestyle    Neutropenia, unspecified type ([address])  Asymptomatic  Start work up  -     CBC with Auto Differential; Future  -     Path Review, Smear; Future  -     Vitamin B12 & Folate; Future  -     Iron and TIBC; Future  -     Ferritin; Future  -     SULMA by IFA w/Reflex; Future    Moderate major depression (HCC)  Needs better control  Increase effexor to 75 mg  Add elavil qhs  Advised counseling/psychiatry consult- she is still considering    Chronic migraine without aura without status migrainosus, not intractable  Fair control  Cont propranolol, add elavil    Sleep difficulties  Add elavil    Other orders  -     venlafaxine (EFFEXOR XR) 75 MG extended release capsule; Take 2 capsules by mouth daily  -     amitriptyline (ELAVIL) 25 MG tablet; Take 1 tablet by mouth nightly        Ff-up in 4 weeks or sooner prn      Patient understands plan of care. Patient has provided input and agrees with goals.

## 2023-11-21 RX ORDER — VENLAFAXINE HYDROCHLORIDE 37.5 MG/1
37.5 CAPSULE, EXTENDED RELEASE ORAL DAILY
Qty: 30 CAPSULE | Refills: 0 | OUTPATIENT
Start: 2023-11-21

## 2023-12-18 RX ORDER — VENLAFAXINE HYDROCHLORIDE 75 MG/1
150 CAPSULE, EXTENDED RELEASE ORAL DAILY
Qty: 60 CAPSULE | Refills: 0 | Status: SHIPPED | OUTPATIENT
Start: 2023-12-18 | End: 2023-12-18 | Stop reason: SDUPTHER

## 2023-12-18 RX ORDER — VENLAFAXINE HYDROCHLORIDE 75 MG/1
150 CAPSULE, EXTENDED RELEASE ORAL DAILY
Qty: 180 CAPSULE | Refills: 0 | Status: SHIPPED | OUTPATIENT
Start: 2023-12-18 | End: 2024-01-27 | Stop reason: SDUPTHER

## 2023-12-26 RX ORDER — VENLAFAXINE HYDROCHLORIDE 75 MG/1
150 CAPSULE, EXTENDED RELEASE ORAL DAILY
Qty: 180 CAPSULE | Refills: 0 | Status: CANCELLED | OUTPATIENT
Start: 2023-12-26

## 2024-01-05 ENCOUNTER — HOSPITAL ENCOUNTER (OUTPATIENT)
Facility: HOSPITAL | Age: 29
Discharge: HOME | End: 2024-01-08

## 2024-01-05 LAB — SENTARA SPECIMEN COLLECTION: NORMAL

## 2024-01-09 LAB
ANA HOMOGENEOUS: ABNORMAL TITER
ANA NUCLEOLAR: NEGATIVE TITER
ANA PERIPHERAL: NEGATIVE TITER
ANA SCREEN: POSITIVE
ANA SPECKLED: NEGATIVE TITER
BASOPHILS # BLD: 1 % (ref 0–2)
BASOPHILS ABSOLUTE: 0 K/UL (ref 0–0.2)
CENTROMERE AB: NEGATIVE TITER
EOSINOPHIL # BLD: 2 % (ref 0–6)
EOSINOPHILS ABSOLUTE: 0.1 K/UL (ref 0–0.5)
FERRITIN: 112 NG/ML (ref 10–291)
FOLATE: >20 NG/ML
HCT VFR BLD CALC: 41.2 % (ref 35.1–46.5)
HEMOGLOBIN: 13.3 G/DL (ref 11.7–15.5)
IRON % SATURATION: 38 % (ref 20–50)
IRON: 89 MCG/DL (ref 30–160)
LYMPHOCYTES # BLD: 35 % (ref 20–45)
LYMPHOCYTES ABSOLUTE: 1.6 K/UL (ref 1–4.8)
MCH RBC QN AUTO: 30 PG (ref 26–34)
MCHC RBC AUTO-ENTMCNC: 32 G/DL (ref 31–36)
MCV RBC AUTO: 93 FL (ref 80–99)
MONOCYTES ABSOLUTE: 0.4 K/UL (ref 0.1–1)
MONOCYTES: 8 % (ref 3–12)
NEUTROPHILS ABSOLUTE: 2.4 K/UL (ref 1.8–7.7)
NEUTROPHILS: 54 % (ref 40–75)
PATH REVIEW OF SMEAR: NORMAL
PDW BLD-RTO: 12.3 % (ref 10–15.5)
PLATELET # BLD: 246 K/UL (ref 140–440)
PMV BLD AUTO: 10.7 FL (ref 9–13)
RBC: 4.41 M/UL (ref 3.8–5.2)
TOTAL IRON BINDING CAPACITY: 235 MCG/DL (ref 228–428)
UIBC: 146 MCG/DL (ref 110–370)
VITAMIN B-12: 654 PG/ML (ref 211–911)
WBC: 4.5 K/UL (ref 4–11)

## 2024-01-10 LAB
ANTI-DNA (DS) AB QN: <1 IU/ML
CENTROMERE B AB: <0.2 AI
CHROMATIN ANTIBODY: <0.2 AI
JO-1 ANTIBODY: <0.2 AI
RNP ANTIBODY: <0.2 AI
SCLERODERMA (SCL-70) AB: <0.2 AI
SJOGREN'S ANTI-SS-A: <0.2 AI
SJOGREN'S ANTI-SS-B: <0.2 AI
SMITH ABS: <0.2 AI

## 2024-01-11 ENCOUNTER — OFFICE VISIT (OUTPATIENT)
Age: 29
End: 2024-01-11
Payer: COMMERCIAL

## 2024-01-11 VITALS
WEIGHT: 162 LBS | BODY MASS INDEX: 27.66 KG/M2 | OXYGEN SATURATION: 98 % | HEIGHT: 64 IN | SYSTOLIC BLOOD PRESSURE: 108 MMHG | RESPIRATION RATE: 16 BRPM | HEART RATE: 83 BPM | TEMPERATURE: 97.5 F | DIASTOLIC BLOOD PRESSURE: 66 MMHG

## 2024-01-11 DIAGNOSIS — F32.1 MODERATE MAJOR DEPRESSION (HCC): Primary | ICD-10-CM

## 2024-01-11 DIAGNOSIS — R53.82 CHRONIC FATIGUE: ICD-10-CM

## 2024-01-11 DIAGNOSIS — R76.8 ANA POSITIVE: ICD-10-CM

## 2024-01-11 DIAGNOSIS — G43.709 CHRONIC MIGRAINE WITHOUT AURA WITHOUT STATUS MIGRAINOSUS, NOT INTRACTABLE: ICD-10-CM

## 2024-01-11 DIAGNOSIS — G47.9 SLEEP DIFFICULTIES: ICD-10-CM

## 2024-01-11 PROCEDURE — 99214 OFFICE O/P EST MOD 30 MIN: CPT | Performed by: FAMILY MEDICINE

## 2024-01-11 RX ORDER — PROPRANOLOL HCL 60 MG
60 CAPSULE, EXTENDED RELEASE 24HR ORAL DAILY
Qty: 90 CAPSULE | Refills: 1 | Status: SHIPPED | OUTPATIENT
Start: 2024-01-11

## 2024-01-11 NOTE — PROGRESS NOTES
to display       A1c: No results found for: \"LABA1C\", \"HBA1C\"      No data recorded      ASSESSMENT/PLAN  Jayda was seen today for annual exam.    Diagnoses and all orders for this visit:      Moderate major depression (HCC)  improving  Cont effexor  cont elavil qhs    Chronic migraine without aura without status migrainosus, not intractable  improving  Cont propranolol, elavil    Sleep difficulties  Responding to  elavil    SULMA positive  As part of neutropenia work up, no there symptoms than fatigue  -     External Referral To Rheumatology    Chronic fatigue  -     External Referral To Rheumatology    Other orders  -     propranolol (INDERAL LA) 60 MG extended release capsule; Take 1 capsule by mouth daily      Ff-up in 12 weeks or sooner prn      Patient understands plan of care. Patient has provided input and agrees with goals.

## 2024-01-23 RX ORDER — PROPRANOLOL HCL 60 MG
60 CAPSULE, EXTENDED RELEASE 24HR ORAL DAILY
Qty: 90 CAPSULE | Refills: 1 | Status: SHIPPED | OUTPATIENT
Start: 2024-01-23

## 2024-01-27 NOTE — TELEPHONE ENCOUNTER
This patient contacted the office for the following prescriptions to be refilled:    Medication requested :   Requested Prescriptions     Pending Prescriptions Disp Refills    venlafaxine (EFFEXOR XR) 75 MG extended release capsule 180 capsule 0     Sig: Take 2 capsules by mouth daily      LAST REFILLED: 12/18/2023  PCP: Thu Neumann MD  LOV: 1/11/2024  NOV: 4/11/2024  FUTURE APPT:   Future Appointments   Date Time Provider Department Center   4/11/2024  2:00 PM Thu Neumann MD [facility] BS AMB     LABS:   Results for orders placed or performed during the hospital encounter of 01/05/24 (from the past 2160 hour(s))   [facility] specimen collection   Result Value Ref Range    [facility] Specimen Collection Specimens collected/sent to [facility]     Results for orders placed or performed in visit on 01/05/24 (from the past 2160 hour(s))   Ferritin   Result Value Ref Range    Ferritin 112 10 - 291 ng/mL   Folate   Result Value Ref Range    Folate >20.00 >=3.10 ng/mL   Iron and TIBC   Result Value Ref Range    Iron 89 30 - 160 mcg/dL    UIBC 146 110 - 370 mcg/dL    TIBC 235 228 - 428 mcg/dL    Iron % Saturation 38 20 - 50 %   Vitamin B12   Result Value Ref Range    Vitamin B-12 654 211 - 911 pg/mL   SULMA by IFA w/Reflex   Result Value Ref Range    SULMA SCREEN Positive (A) Negative   CBC with Auto Differential   Result Value Ref Range    WBC 4.5 4.0 - 11.0 K/uL    RBC 4.41 3.80 - 5.20 M/uL    Hemoglobin 13.3 11.7 - 15.5 g/dL    Hematocrit 41.2 35.1 - 46.5 %    MCV 93 80 - 99 fL    MCH 30 26 - 34 pg    MCHC 32 31 - 36 g/dL    RDW 12.3 10.0 - 15.5 %    Platelets 246 140 - 440 K/uL    MPV 10.7 9.0 - 13.0 fL    Neutrophils 54 40 - 75 %    Lymphocytes 35 20 - 45 %    Monocytes 8 3 - 12 %    Eosinophils 2 0 - 6 %    Basophils 1 0 - 2 %    Neutrophils Absolute 2.4 1.8 - 7.7 K/uL    Lymphocytes Absolute 1.6 1.0 - 4.8 K/uL    Monocytes Absolute 0.4 0.1 - 1.0 K/uL    Eosinophils Absolute 0.1 0.0 - 0.5 K/uL    Basophils Absolute 0.0 0.0

## 2024-01-29 RX ORDER — VENLAFAXINE HYDROCHLORIDE 75 MG/1
150 CAPSULE, EXTENDED RELEASE ORAL DAILY
Qty: 180 CAPSULE | Refills: 0 | Status: SHIPPED | OUTPATIENT
Start: 2024-01-29

## 2024-02-26 RX ORDER — AMITRIPTYLINE HYDROCHLORIDE 25 MG/1
25 TABLET, FILM COATED ORAL NIGHTLY
Qty: 90 TABLET | Refills: 0 | Status: SHIPPED | OUTPATIENT
Start: 2024-02-26

## 2024-02-26 RX ORDER — VENLAFAXINE HYDROCHLORIDE 75 MG/1
150 CAPSULE, EXTENDED RELEASE ORAL DAILY
Qty: 180 CAPSULE | Refills: 0 | Status: SHIPPED | OUTPATIENT
Start: 2024-02-26

## 2024-02-26 NOTE — TELEPHONE ENCOUNTER
This patient contacted the office for the following prescriptions to be refilled:    Medication requested :   Requested Prescriptions     Pending Prescriptions Disp Refills    venlafaxine (EFFEXOR XR) 75 MG extended release capsule 180 capsule 0     Sig: Take 2 capsules by mouth daily      LAST REFILLED: 01/29/2024 180 tabs, 0 refills  PCP: Thu Neumann MD  LOV: 1/11/2024  NOV: 4/11/2024  FUTURE APPT:   Future Appointments   Date Time Provider Department Center   4/11/2024  2:00 PM Thu Neumann MD [facility] BS AMB     LABS:   Results for orders placed or performed during the hospital encounter of 01/05/24 (from the past 2160 hour(s))   [facility] specimen collection   Result Value Ref Range    [facility]     Results for orders placed or performed in visit on 01/05/24 (from the past 2160 hour(s))   Ferritin   Result Value Ref Range    Ferritin 112 10 - 291 ng/mL   Folate   Result Value Ref Range    Folate >20.00 >=3.10 ng/mL   Iron and TIBC   Result Value Ref Range    Iron 89 30 - 160 mcg/dL    UIBC 146 110 - 370 mcg/dL    TIBC 235 228 - 428 mcg/dL    Iron % Saturation 38 20 - 50 %   Vitamin B12   Result Value Ref Range    Vitamin B-12 654 211 - 911 pg/mL   SULMA by IFA w/Reflex   Result Value Ref Range    SULMA SCREEN Positive (A) Negative   CBC with Auto Differential   Result Value Ref Range    WBC 4.5 4.0 - 11.0 K/uL    RBC 4.41 3.80 - 5.20 M/uL    Hemoglobin 13.3 11.7 - 15.5 g/dL    Hematocrit 41.2 35.1 - 46.5 %    MCV 93 80 - 99 fL    MCH 30 26 - 34 pg    MCHC 32 31 - 36 g/dL    RDW 12.3 10.0 - 15.5 %    Platelets 246 140 - 440 K/uL    MPV 10.7 9.0 - 13.0 fL    Neutrophils 54 40 - 75 %    Lymphocytes 35 20 - 45 %    Monocytes 8 3 - 12 %    Eosinophils 2 0 - 6 %    Basophils 1 0 - 2 %    Neutrophils Absolute 2.4 1.8 - 7.7 K/uL    Lymphocytes Absolute 1.6 1.0 - 4.8 K/uL    Monocytes Absolute 0.4 0.1 - 1.0 K/uL    Eosinophils Absolute 0.1 0.0 - 0.5 K/uL

## 2024-02-26 NOTE — TELEPHONE ENCOUNTER
This patient contacted the office for the following prescriptions to be refilled:    Medication requested :   Requested Prescriptions     Pending Prescriptions Disp Refills    amitriptyline (ELAVIL) 25 MG tablet [Pharmacy Med Name: AMITRIPTYLINE HCL 25 MG TAB] 30 tablet      Sig: TAKE ONE TABLET BY MOUTH ONCE NIGHTLY      LAST REFILLED: 11/08/2023 90 tabs,0 RF  PCP: Thu Neumann MD  LOV: 1/11/2024  NOV: 2/25/2024  FUTURE APPT:   Future Appointments   Date Time Provider Department Center   4/11/2024  2:00 PM Thu Neumann MD [facility] BS AMB     LABS:   Results for orders placed or performed during the hospital encounter of 01/05/24 (from the past 2160 hour(s))   [facility] specimen collection   Result Value Ref Range    [facility]     Results for orders placed or performed in visit on 01/05/24 (from the past 2160 hour(s))   Ferritin   Result Value Ref Range    Ferritin 112 10 - 291 ng/mL   Folate   Result Value Ref Range    Folate >20.00 >=3.10 ng/mL   Iron and TIBC   Result Value Ref Range    Iron 89 30 - 160 mcg/dL    UIBC 146 110 - 370 mcg/dL    TIBC 235 228 - 428 mcg/dL    Iron % Saturation 38 20 - 50 %   Vitamin B12   Result Value Ref Range    Vitamin B-12 654 211 - 911 pg/mL   SULMA by IFA w/Reflex   Result Value Ref Range    SULMA SCREEN Positive (A) Negative   CBC with Auto Differential   Result Value Ref Range    WBC 4.5 4.0 - 11.0 K/uL    RBC 4.41 3.80 - 5.20 M/uL    Hemoglobin 13.3 11.7 - 15.5 g/dL    Hematocrit 41.2 35.1 - 46.5 %    MCV 93 80 - 99 fL    MCH 30 26 - 34 pg    MCHC 32 31 - 36 g/dL    RDW 12.3 10.0 - 15.5 %    Platelets 246 140 - 440 K/uL    MPV 10.7 9.0 - 13.0 fL    Neutrophils 54 40 - 75 %    Lymphocytes 35 20 - 45 %    Monocytes 8 3 - 12 %    Eosinophils 2 0 - 6 %    Basophils 1 0 - 2 %    Neutrophils Absolute 2.4 1.8 - 7.7 K/uL    Lymphocytes Absolute 1.6 1.0 - 4.8 K/uL    Monocytes Absolute 0.4 0.1 - 1.0 K/uL    Eosinophils Absolute

## 2024-03-27 RX ORDER — VENLAFAXINE HYDROCHLORIDE 75 MG/1
150 CAPSULE, EXTENDED RELEASE ORAL DAILY
Qty: 180 CAPSULE | Refills: 0 | Status: SHIPPED | OUTPATIENT
Start: 2024-03-27

## 2024-04-18 ENCOUNTER — OFFICE VISIT (OUTPATIENT)
Age: 29
End: 2024-04-18
Payer: COMMERCIAL

## 2024-04-18 VITALS — DIASTOLIC BLOOD PRESSURE: 74 MMHG | WEIGHT: 163 LBS | SYSTOLIC BLOOD PRESSURE: 108 MMHG | BODY MASS INDEX: 27.98 KG/M2

## 2024-04-18 DIAGNOSIS — D70.9 NEUTROPENIA, UNSPECIFIED TYPE (HCC): ICD-10-CM

## 2024-04-18 DIAGNOSIS — E04.9 ENLARGED THYROID: ICD-10-CM

## 2024-04-18 DIAGNOSIS — G43.709 CHRONIC MIGRAINE WITHOUT AURA WITHOUT STATUS MIGRAINOSUS, NOT INTRACTABLE: ICD-10-CM

## 2024-04-18 DIAGNOSIS — G47.9 SLEEP DIFFICULTIES: ICD-10-CM

## 2024-04-18 DIAGNOSIS — F32.1 MODERATE MAJOR DEPRESSION (HCC): Primary | ICD-10-CM

## 2024-04-18 DIAGNOSIS — R53.82 CHRONIC FATIGUE: ICD-10-CM

## 2024-04-18 PROCEDURE — 99214 OFFICE O/P EST MOD 30 MIN: CPT | Performed by: FAMILY MEDICINE

## 2024-04-18 ASSESSMENT — PATIENT HEALTH QUESTIONNAIRE - PHQ9
SUM OF ALL RESPONSES TO PHQ9 QUESTIONS 1 & 2: 0
7. TROUBLE CONCENTRATING ON THINGS, SUCH AS READING THE NEWSPAPER OR WATCHING TELEVISION: NOT AT ALL
8. MOVING OR SPEAKING SO SLOWLY THAT OTHER PEOPLE COULD HAVE NOTICED. OR THE OPPOSITE, BEING SO FIGETY OR RESTLESS THAT YOU HAVE BEEN MOVING AROUND A LOT MORE THAN USUAL: NOT AT ALL
SUM OF ALL RESPONSES TO PHQ QUESTIONS 1-9: 0
10. IF YOU CHECKED OFF ANY PROBLEMS, HOW DIFFICULT HAVE THESE PROBLEMS MADE IT FOR YOU TO DO YOUR WORK, TAKE CARE OF THINGS AT HOME, OR GET ALONG WITH OTHER PEOPLE: NOT DIFFICULT AT ALL
4. FEELING TIRED OR HAVING LITTLE ENERGY: NOT AT ALL
6. FEELING BAD ABOUT YOURSELF - OR THAT YOU ARE A FAILURE OR HAVE LET YOURSELF OR YOUR FAMILY DOWN: NOT AT ALL
9. THOUGHTS THAT YOU WOULD BE BETTER OFF DEAD, OR OF HURTING YOURSELF: NOT AT ALL
SUM OF ALL RESPONSES TO PHQ QUESTIONS 1-9: 0
1. LITTLE INTEREST OR PLEASURE IN DOING THINGS: NOT AT ALL
SUM OF ALL RESPONSES TO PHQ QUESTIONS 1-9: 0
2. FEELING DOWN, DEPRESSED OR HOPELESS: NOT AT ALL
5. POOR APPETITE OR OVEREATING: NOT AT ALL
3. TROUBLE FALLING OR STAYING ASLEEP: NOT AT ALL
SUM OF ALL RESPONSES TO PHQ QUESTIONS 1-9: 0

## 2024-04-18 NOTE — PROGRESS NOTES
Jayda Trevino, 28 y.o.,  female    SUBJECTIVE  Ff-up      Migraine headaches-  reports better sleep and headaches on added elavil. Continues to take propranolol with and effexor. MRI brain 12/2022 neg  She is a .  Depression- doing well , on effexor.     Neutropenia- further work up showing positive SULMA, she reports some fatigue, no joint pains/swelling, dry eyes, fam hx autoimmune process. Was referred to rheum (dr. Ross) however has not scheduled yet, provided #    ROS:  See HPI, all others negative        Patient Active Problem List   Diagnosis    Keloid    ADD (attention deficit disorder)    Moderate major depression (HCC)    Chronic migraine without aura without status migrainosus, not intractable    Sleep difficulties    Chronic fatigue       Current Outpatient Medications   Medication Sig Dispense Refill    venlafaxine (EFFEXOR XR) 75 MG extended release capsule Take 2 capsules by mouth daily 180 capsule 0    amitriptyline (ELAVIL) 25 MG tablet TAKE ONE TABLET BY MOUTH ONCE NIGHTLY 90 tablet 0    propranolol (INDERAL LA) 60 MG extended release capsule TAKE ONE CAPSULE BY MOUTH DAILY 90 capsule 1     No current facility-administered medications for this visit.       No Known Allergies    No past medical history on file.    Social History     Socioeconomic History    Marital status: Single     Spouse name: Not on file    Number of children: Not on file    Years of education: Not on file    Highest education level: Not on file   Occupational History    Not on file   Tobacco Use    Smoking status: Never    Smokeless tobacco: Never   Substance and Sexual Activity    Alcohol use: Not Currently    Drug use: No    Sexual activity: Not on file   Other Topics Concern    Not on file   Social History Narrative    Not on file     Social Determinants of Health     Financial Resource Strain: Low Risk  (11/8/2022)    Overall Financial Resource Strain (CARDIA)     Difficulty of Paying Living Expenses: Not

## 2024-04-22 ENCOUNTER — HOSPITAL ENCOUNTER (OUTPATIENT)
Facility: HOSPITAL | Age: 29
Discharge: HOME | End: 2024-04-25
Attending: FAMILY MEDICINE
Payer: COMMERCIAL

## 2024-04-22 DIAGNOSIS — E04.9 ENLARGED THYROID: ICD-10-CM

## 2024-04-22 PROCEDURE — 76536 US EXAM OF HEAD AND NECK: CPT

## 2024-05-01 NOTE — TELEPHONE ENCOUNTER
Last OV 04/18/2024  Next OV No f/u My Chart message sent to schedule an appt 07/2024  Last lab 01/05/2024

## 2024-05-02 RX ORDER — VENLAFAXINE HYDROCHLORIDE 75 MG/1
150 CAPSULE, EXTENDED RELEASE ORAL DAILY
Qty: 180 CAPSULE | Refills: 0 | Status: SHIPPED | OUTPATIENT
Start: 2024-05-02

## 2024-05-23 RX ORDER — AMITRIPTYLINE HYDROCHLORIDE 25 MG/1
25 TABLET, FILM COATED ORAL NIGHTLY
Qty: 90 TABLET | Refills: 0 | Status: SHIPPED | OUTPATIENT
Start: 2024-05-23

## 2024-06-27 RX ORDER — VENLAFAXINE HYDROCHLORIDE 75 MG/1
150 CAPSULE, EXTENDED RELEASE ORAL DAILY
Qty: 180 CAPSULE | Refills: 0 | Status: SHIPPED | OUTPATIENT
Start: 2024-06-27

## 2024-07-23 NOTE — TELEPHONE ENCOUNTER
Last OV 04/18/2024  Next OV none scheduled  Return in about 3 months (around 7/18/2024)   My chart message sent to patient  to schedule a 3 mo f/u/ cpe

## 2024-07-24 RX ORDER — VENLAFAXINE HYDROCHLORIDE 75 MG/1
150 CAPSULE, EXTENDED RELEASE ORAL DAILY
Qty: 180 CAPSULE | Refills: 0 | Status: SHIPPED | OUTPATIENT
Start: 2024-07-24

## 2024-08-09 NOTE — PROGRESS NOTES
\"Have you been to the ER, urgent care clinic since your last visit?  Hospitalized since your last visit?\"    NO    “Have you seen or consulted any other health care providers outside of [facility] since your last visit?”    NO            Click Here for Release of Records Request\"

## 2024-08-13 ENCOUNTER — OFFICE VISIT (OUTPATIENT)
Facility: CLINIC | Age: 29
End: 2024-08-13
Payer: COMMERCIAL

## 2024-08-13 VITALS
HEIGHT: 64 IN | SYSTOLIC BLOOD PRESSURE: 120 MMHG | BODY MASS INDEX: 28.68 KG/M2 | DIASTOLIC BLOOD PRESSURE: 80 MMHG | WEIGHT: 168 LBS | OXYGEN SATURATION: 98 % | TEMPERATURE: 98.7 F | RESPIRATION RATE: 16 BRPM | HEART RATE: 113 BPM

## 2024-08-13 DIAGNOSIS — F32.1 MODERATE MAJOR DEPRESSION (HCC): ICD-10-CM

## 2024-08-13 DIAGNOSIS — G47.9 SLEEP DIFFICULTIES: ICD-10-CM

## 2024-08-13 DIAGNOSIS — R63.5 WEIGHT GAIN: Primary | ICD-10-CM

## 2024-08-13 DIAGNOSIS — G43.709 CHRONIC MIGRAINE WITHOUT AURA WITHOUT STATUS MIGRAINOSUS, NOT INTRACTABLE: ICD-10-CM

## 2024-08-13 PROCEDURE — 99214 OFFICE O/P EST MOD 30 MIN: CPT | Performed by: FAMILY MEDICINE

## 2024-08-13 NOTE — PROGRESS NOTES
Jayda Trevino, 28 y.o.,  female    SUBJECTIVE  Difficulty losing weight    Patient reports difficulty losing weight-has been counting calories taking in 1600 to 1700/day, has been incorporating exercise every other day.  She is on Elavil for migraines and sleep difficulty, she says her migraines have been pretty well-controlled last episode was a month ago prior to that 1 months prior    Migraine headaches-Continues to take propranolol with and effexor. MRI brain 12/2022 neg  She is a .  Depression- doing well , on effexor.     Neutropenia- further work up showing positive SULMA, she reports some fatigue, no joint pains/swelling, dry eyes, fam hx autoimmune process.  She has standing order for CBC to monitor this advised to have done    ROS:  See HPI, all others negative        Patient Active Problem List   Diagnosis    Keloid    ADD (attention deficit disorder)    Moderate major depression (HCC)    Chronic migraine without aura without status migrainosus, not intractable    Sleep difficulties    Chronic fatigue       Current Outpatient Medications   Medication Sig Dispense Refill    venlafaxine (EFFEXOR XR) 75 MG extended release capsule Take 2 capsules by mouth daily 180 capsule 0    propranolol (INDERAL LA) 60 MG extended release capsule TAKE ONE CAPSULE BY MOUTH DAILY 90 capsule 1     No current facility-administered medications for this visit.       No Known Allergies    No past medical history on file.    Social History     Socioeconomic History    Marital status: Single     Spouse name: Not on file    Number of children: Not on file    Years of education: Not on file    Highest education level: Not on file   Occupational History    Not on file   Tobacco Use    Smoking status: Never    Smokeless tobacco: Never   Substance and Sexual Activity    Alcohol use: Not Currently    Drug use: No    Sexual activity: Not on file   Other Topics Concern    Not on file   Social History Narrative    Not on file

## 2024-09-25 NOTE — TELEPHONE ENCOUNTER
Last OV 08/13/2024  Next OV none scheduled    Return in about 4 weeks (around 9/10/2024) weight check    
36.7

## 2024-09-30 RX ORDER — VENLAFAXINE HYDROCHLORIDE 75 MG/1
150 CAPSULE, EXTENDED RELEASE ORAL DAILY
Qty: 60 CAPSULE | Refills: 0 | Status: SHIPPED | OUTPATIENT
Start: 2024-09-30

## 2024-11-26 RX ORDER — VENLAFAXINE HYDROCHLORIDE 75 MG/1
150 CAPSULE, EXTENDED RELEASE ORAL DAILY
Qty: 60 CAPSULE | Refills: 0 | Status: SHIPPED | OUTPATIENT
Start: 2024-11-26

## 2024-12-27 RX ORDER — VENLAFAXINE HYDROCHLORIDE 75 MG/1
150 CAPSULE, EXTENDED RELEASE ORAL DAILY
Qty: 60 CAPSULE | Refills: 0 | Status: SHIPPED | OUTPATIENT
Start: 2024-12-27

## 2025-01-08 ENCOUNTER — OFFICE VISIT (OUTPATIENT)
Facility: CLINIC | Age: 30
End: 2025-01-08
Payer: COMMERCIAL

## 2025-01-08 VITALS
TEMPERATURE: 98.3 F | BODY MASS INDEX: 29.19 KG/M2 | OXYGEN SATURATION: 100 % | SYSTOLIC BLOOD PRESSURE: 110 MMHG | RESPIRATION RATE: 18 BRPM | WEIGHT: 171 LBS | HEIGHT: 64 IN | DIASTOLIC BLOOD PRESSURE: 72 MMHG | HEART RATE: 94 BPM

## 2025-01-08 DIAGNOSIS — D70.9 NEUTROPENIA, UNSPECIFIED TYPE (HCC): ICD-10-CM

## 2025-01-08 DIAGNOSIS — G43.709 CHRONIC MIGRAINE WITHOUT AURA WITHOUT STATUS MIGRAINOSUS, NOT INTRACTABLE: Primary | ICD-10-CM

## 2025-01-08 DIAGNOSIS — F32.1 MODERATE MAJOR DEPRESSION (HCC): ICD-10-CM

## 2025-01-08 PROCEDURE — 99214 OFFICE O/P EST MOD 30 MIN: CPT | Performed by: FAMILY MEDICINE

## 2025-01-08 SDOH — ECONOMIC STABILITY: FOOD INSECURITY: WITHIN THE PAST 12 MONTHS, YOU WORRIED THAT YOUR FOOD WOULD RUN OUT BEFORE YOU GOT MONEY TO BUY MORE.: NEVER TRUE

## 2025-01-08 SDOH — ECONOMIC STABILITY: FOOD INSECURITY: WITHIN THE PAST 12 MONTHS, THE FOOD YOU BOUGHT JUST DIDN'T LAST AND YOU DIDN'T HAVE MONEY TO GET MORE.: NEVER TRUE

## 2025-01-08 SDOH — ECONOMIC STABILITY: INCOME INSECURITY: IN THE LAST 12 MONTHS, WAS THERE A TIME WHEN YOU WERE NOT ABLE TO PAY THE MORTGAGE OR RENT ON TIME?: NO

## 2025-01-08 SDOH — ECONOMIC STABILITY: TRANSPORTATION INSECURITY
IN THE PAST 12 MONTHS, HAS THE LACK OF TRANSPORTATION KEPT YOU FROM MEDICAL APPOINTMENTS OR FROM GETTING MEDICATIONS?: NO

## 2025-01-08 ASSESSMENT — PATIENT HEALTH QUESTIONNAIRE - PHQ9
8. MOVING OR SPEAKING SO SLOWLY THAT OTHER PEOPLE COULD HAVE NOTICED. OR THE OPPOSITE, BEING SO FIGETY OR RESTLESS THAT YOU HAVE BEEN MOVING AROUND A LOT MORE THAN USUAL: NOT AT ALL
5. POOR APPETITE OR OVEREATING: NOT AT ALL
10. IF YOU CHECKED OFF ANY PROBLEMS, HOW DIFFICULT HAVE THESE PROBLEMS MADE IT FOR YOU TO DO YOUR WORK, TAKE CARE OF THINGS AT HOME, OR GET ALONG WITH OTHER PEOPLE: SOMEWHAT DIFFICULT
3. TROUBLE FALLING OR STAYING ASLEEP: SEVERAL DAYS
SUM OF ALL RESPONSES TO PHQ QUESTIONS 1-9: 3
SUM OF ALL RESPONSES TO PHQ QUESTIONS 1-9: 3
6. FEELING BAD ABOUT YOURSELF - OR THAT YOU ARE A FAILURE OR HAVE LET YOURSELF OR YOUR FAMILY DOWN: NOT AT ALL
SUM OF ALL RESPONSES TO PHQ9 QUESTIONS 1 & 2: 0
1. LITTLE INTEREST OR PLEASURE IN DOING THINGS: NOT AT ALL
2. FEELING DOWN, DEPRESSED OR HOPELESS: NOT AT ALL
4. FEELING TIRED OR HAVING LITTLE ENERGY: SEVERAL DAYS
9. THOUGHTS THAT YOU WOULD BE BETTER OFF DEAD, OR OF HURTING YOURSELF: NOT AT ALL
SUM OF ALL RESPONSES TO PHQ QUESTIONS 1-9: 3
7. TROUBLE CONCENTRATING ON THINGS, SUCH AS READING THE NEWSPAPER OR WATCHING TELEVISION: SEVERAL DAYS
SUM OF ALL RESPONSES TO PHQ QUESTIONS 1-9: 3

## 2025-01-08 NOTE — PROGRESS NOTES
Jayda Trevino, 29 y.o.,  female    SUBJECTIVE  Ff-up      Migraine headaches-Continues to take effexor, no longer on propranolol or elavil. MRI brain 12/2022 neg. No weight loss observed even after elavil was discontinued. She has been on 1400 josefa/day diet, and treadmill 45 min/day.   She is a .  Depression- doing well , on effexor.     Neutropenia- further work up showing positive SULMA, she reports some fatigue, no joint pains/swelling, dry eyes, fam hx autoimmune process.  She has standing order for CBC and will repeat SULMA.     ROS:  See HPI, all others negative        Patient Active Problem List   Diagnosis    Keloid    ADD (attention deficit disorder)    Moderate major depression (HCC)    Chronic migraine without aura without status migrainosus, not intractable    Sleep difficulties    Chronic fatigue       Current Outpatient Medications   Medication Sig Dispense Refill    venlafaxine (EFFEXOR XR) 75 MG extended release capsule Take 2 capsules by mouth daily 60 capsule 0    propranolol (INDERAL LA) 60 MG extended release capsule TAKE ONE CAPSULE BY MOUTH DAILY (Patient not taking: Reported on 1/8/2025) 90 capsule 1     No current facility-administered medications for this visit.       No Known Allergies    Past Medical History:   Diagnosis Date    Headache        Social History     Socioeconomic History    Marital status: Single     Spouse name: Not on file    Number of children: Not on file    Years of education: Not on file    Highest education level: Not on file   Occupational History    Not on file   Tobacco Use    Smoking status: Never    Smokeless tobacco: Never   Substance and Sexual Activity    Alcohol use: Not Currently    Drug use: No    Sexual activity: Yes     Partners: Male     Birth control/protection: Condom   Other Topics Concern    Not on file   Social History Narrative    Not on file     Social Determinants of Health     Financial Resource Strain: Low Risk  (11/8/2022)    Overall

## 2025-03-10 RX ORDER — VENLAFAXINE HYDROCHLORIDE 75 MG/1
150 CAPSULE, EXTENDED RELEASE ORAL DAILY
Qty: 180 CAPSULE | Refills: 0 | Status: SHIPPED | OUTPATIENT
Start: 2025-03-10

## 2025-05-05 RX ORDER — VENLAFAXINE HYDROCHLORIDE 75 MG/1
150 CAPSULE, EXTENDED RELEASE ORAL DAILY
Qty: 180 CAPSULE | Refills: 0 | Status: SHIPPED | OUTPATIENT
Start: 2025-05-05

## 2025-08-28 ENCOUNTER — HOSPITAL ENCOUNTER (OUTPATIENT)
Age: 30
Discharge: HOME | End: 2025-08-31

## 2025-08-28 LAB
BASOPHILS # BLD: 1 % (ref 0–2)
BASOPHILS ABSOLUTE: 0 K/UL (ref 0–0.2)
EOSINOPHIL # BLD: 1 % (ref 0–6)
EOSINOPHILS ABSOLUTE: 0.1 K/UL (ref 0–0.5)
HCT VFR BLD CALC: 41.3 % (ref 35.1–46.5)
HEMOGLOBIN: 12.9 G/DL (ref 11.7–15.5)
LYMPHOCYTES # BLD: 36 % (ref 20–45)
LYMPHOCYTES ABSOLUTE: 2.3 K/UL (ref 1–4.8)
MCH RBC QN AUTO: 29 PG (ref 26–34)
MCHC RBC AUTO-ENTMCNC: 31 G/DL (ref 31–36)
MCV RBC AUTO: 94 FL (ref 80–99)
MONOCYTES ABSOLUTE: 0.6 K/UL (ref 0.1–1)
MONOCYTES: 10 % (ref 3–12)
NEUTROPHILS ABSOLUTE: 3.3 K/UL (ref 1.8–7.7)
NEUTROPHILS SEGMENTED: 52 % (ref 40–75)
PDW BLD-RTO: 12.9 % (ref 10–15.5)
PLATELET # BLD: 236 K/UL (ref 140–440)
PMV BLD AUTO: 11 FL (ref 9–13)
RBC # BLD: 4.41 M/UL (ref 3.8–5.2)
SENTARA SPECIMEN COLLECTION: NORMAL
WBC # BLD: 6.3 K/UL (ref 4–11)

## 2025-08-29 LAB — TSH SERPL DL<=0.05 MIU/L-ACNC: 2.06 MCU/ML (ref 0.27–4.2)

## 2025-09-02 ENCOUNTER — OFFICE VISIT (OUTPATIENT)
Facility: CLINIC | Age: 30
End: 2025-09-02
Payer: COMMERCIAL

## 2025-09-02 VITALS
HEART RATE: 73 BPM | BODY MASS INDEX: 29.53 KG/M2 | SYSTOLIC BLOOD PRESSURE: 100 MMHG | DIASTOLIC BLOOD PRESSURE: 70 MMHG | OXYGEN SATURATION: 99 % | HEIGHT: 64 IN | RESPIRATION RATE: 16 BRPM | WEIGHT: 173 LBS | TEMPERATURE: 98.9 F

## 2025-09-02 DIAGNOSIS — G43.709 CHRONIC MIGRAINE WITHOUT AURA WITHOUT STATUS MIGRAINOSUS, NOT INTRACTABLE: ICD-10-CM

## 2025-09-02 DIAGNOSIS — F32.1 MODERATE MAJOR DEPRESSION (HCC): ICD-10-CM

## 2025-09-02 DIAGNOSIS — Z12.4 CERVICAL CANCER SCREENING: ICD-10-CM

## 2025-09-02 DIAGNOSIS — Z01.419 WELL WOMAN EXAM WITH ROUTINE GYNECOLOGICAL EXAM: Primary | ICD-10-CM

## 2025-09-02 LAB
ANA BY IFA: POSITIVE
ANA BY IFA: POSITIVE
ANA HOMOGENEOUS: ABNORMAL TITER
ANA HOMOGENEOUS: ABNORMAL TITER
ANA NUCLEOLAR: NEGATIVE TITER
ANA NUCLEOLAR: NEGATIVE TITER
ANA PERIPHERAL: NEGATIVE TITER
ANA PERIPHERAL: NEGATIVE TITER
ANA SPECKLED: NEGATIVE TITER
ANA SPECKLED: NEGATIVE TITER
ANTI-DNA (DS) AB QN: 1 IU/ML
CENTROMERE ANTIBODY: NEGATIVE TITER
CENTROMERE ANTIBODY: NEGATIVE TITER
CENTROMERE B AB: <0.2 AI
CHROMATIN ANTIBODY: <0.2 AI
JO-1 ANTIBODY: <0.2 AI
RNP ANTIBODY: <0.2 AI
SCLERODERMA (SCL-70) AB: <0.2 AI
SJOGREN'S ANTIBODIES (SSA): <0.2 AI
SJOGREN'S ANTIBODIES (SSB): <0.2 AI
SMITH ABS: <0.2 AI

## 2025-09-02 PROCEDURE — 99395 PREV VISIT EST AGE 18-39: CPT | Performed by: FAMILY MEDICINE

## 2025-09-02 RX ORDER — VENLAFAXINE HYDROCHLORIDE 75 MG/1
75 CAPSULE, EXTENDED RELEASE ORAL DAILY
Qty: 90 CAPSULE | Refills: 1 | Status: SHIPPED | OUTPATIENT
Start: 2025-09-02

## 2025-09-05 LAB
COLLECTION METHOD: ABNORMAL
FINAL DIAGNOSIS: ABNORMAL
GENERAL CATEGORIZATION: ABNORMAL
GYNECOLOGIC CYTOLOGY REPORT: ABNORMAL
HPV, GENOTYPE 16: NEGATIVE
HPV, GENOTYPE 18: NEGATIVE
HPV, HIGH RISK: POSITIVE
LMP: ABNORMAL
Lab: ABNORMAL
Lab: ABNORMAL
PAP NOTE: ABNORMAL
RELATED HPV RESULT(S): ABNORMAL
SPECIMEN ADEQUACY:: ABNORMAL